# Patient Record
Sex: FEMALE | Race: WHITE | NOT HISPANIC OR LATINO | Employment: OTHER | URBAN - METROPOLITAN AREA
[De-identification: names, ages, dates, MRNs, and addresses within clinical notes are randomized per-mention and may not be internally consistent; named-entity substitution may affect disease eponyms.]

---

## 2017-12-01 ENCOUNTER — OFFICE VISIT (OUTPATIENT)
Dept: FAMILY MEDICINE | Facility: CLINIC | Age: 32
End: 2017-12-01

## 2017-12-01 VITALS
RESPIRATION RATE: 14 BRPM | BODY MASS INDEX: 22.44 KG/M2 | HEIGHT: 67 IN | DIASTOLIC BLOOD PRESSURE: 70 MMHG | TEMPERATURE: 98 F | SYSTOLIC BLOOD PRESSURE: 110 MMHG | HEART RATE: 70 BPM | OXYGEN SATURATION: 98 % | WEIGHT: 143 LBS

## 2017-12-01 DIAGNOSIS — Z71.89 ACP (ADVANCE CARE PLANNING): ICD-10-CM

## 2017-12-01 DIAGNOSIS — R20.2 PARESTHESIAS: ICD-10-CM

## 2017-12-01 DIAGNOSIS — Z83.49 FAMILY HISTORY OF THYROID DISEASE IN FATHER: ICD-10-CM

## 2017-12-01 DIAGNOSIS — Z01.411 ENCOUNTER FOR GYNECOLOGICAL EXAMINATION WITH ABNORMAL FINDING: Primary | ICD-10-CM

## 2017-12-01 PROBLEM — Z76.89 HEALTH CARE HOME: Status: ACTIVE | Noted: 2017-12-01

## 2017-12-01 LAB
% GRANULOCYTES: 49.1 %
ERYTHROCYTE [SEDIMENTATION RATE] IN BLOOD: 10 MM/HR (ref 0–20)
HCT VFR BLD AUTO: 42 % (ref 35–47)
HEMOGLOBIN: 13.5 G/DL (ref 11.7–15.7)
LYMPHOCYTES NFR BLD AUTO: 42.1 %
MCH RBC QN AUTO: 29.6 PG (ref 26–33)
MCHC RBC AUTO-ENTMCNC: 32.1 G/DL (ref 31–36)
MCV RBC AUTO: 92.1 FL (ref 78–100)
MONOCYTES NFR BLD AUTO: 8.8 %
PLATELET COUNT - QUEST: 212 10^9/L (ref 150–375)
RBC # BLD AUTO: 4.56 10*12/L (ref 3.8–5.2)
WBC # BLD AUTO: 6.2 10*9/L (ref 4–11)

## 2017-12-01 PROCEDURE — 82746 ASSAY OF FOLIC ACID SERUM: CPT | Mod: 90 | Performed by: FAMILY MEDICINE

## 2017-12-01 PROCEDURE — 85651 RBC SED RATE NONAUTOMATED: CPT | Mod: 90 | Performed by: FAMILY MEDICINE

## 2017-12-01 PROCEDURE — 36415 COLL VENOUS BLD VENIPUNCTURE: CPT | Performed by: FAMILY MEDICINE

## 2017-12-01 PROCEDURE — 85025 COMPLETE CBC W/AUTO DIFF WBC: CPT | Performed by: FAMILY MEDICINE

## 2017-12-01 PROCEDURE — 99385 PREV VISIT NEW AGE 18-39: CPT | Performed by: FAMILY MEDICINE

## 2017-12-01 PROCEDURE — 82607 VITAMIN B-12: CPT | Mod: 90 | Performed by: FAMILY MEDICINE

## 2017-12-01 PROCEDURE — 84443 ASSAY THYROID STIM HORMONE: CPT | Mod: 90 | Performed by: FAMILY MEDICINE

## 2017-12-01 PROCEDURE — 88142 CYTOPATH C/V THIN LAYER: CPT | Mod: 90 | Performed by: FAMILY MEDICINE

## 2017-12-01 NOTE — PATIENT INSTRUCTIONS
Await labs. Monitor symptoms with a diary/ ? Factors bringing on symptoms?    Total calcium intake of 1500 mgm/day, vitamin D 400-800IU/day and a regular weight bearing exercise program for prevention of osteoporosis is recommended treatment at this time.    Neurology consultation as next step

## 2017-12-01 NOTE — LETTER
December 11, 2017      Lynn D Adiel  [address] [address] [place] [address]        Dear ,    We are writing to inform you of your test results.    Your test results fall within the expected range(s) and do not indicate a reason for your paresthesia symptoms. Schedule your consultation and continue to monitor your symptoms for location, preceding activities and whatever makes your symptoms better or worse.    Normal thin prep (pap smear) results as well.  See next page        If you have any questions or concerns, please call the clinic at the number listed above.       Sincerely,        Simin Laurent MD

## 2017-12-01 NOTE — LETTER
[facility]  [address]  [address]  [place] [address]  [phone]      December 1, 2017      Lynn Chun  [address] [facility] [address]      EMERGENCY CARE PLAN  Presenting Problem Treatment Plan   Questions or concerns during clinic hours I will call the clinic directly:    [facility]  [address] [place] [address]  [place] [address]  [phone]   Questions or concerns outside clinic hours  I will call the 24 hour line at [phone]   Patient needs to schedule an appointment  I will call the  scheduling line at [phone]   Same day treatment   I will call the clinic first, then  urgent care and/or  express care if needed   Clinic Care Coordinators JARRED BennettW:  [phone]  Mari Schaefer RN:  [phone]  [facility] Clinical Support Staff: [phone]    Crisis Services:  Behavioral or Mental Health [facility] ([facility])   [phone]   Emergency treatment--Immediately CALL [phone]

## 2017-12-01 NOTE — PROGRESS NOTES
SUBJECTIVE:  Lynn Chun is an 32 year old G 2 P 2 woman who presents for   annual gyn exam. She made this appointment to discuss some pins and needles sensations she is having on her left outer leg and calf and now her right hand and forearm. See below. She knows this is not a part of a preventative exam.     Patient's last menstrual period was 11/10/2017. Periods are regular q 28-30 days, lasting   5 days. Dysmenorrhea:none. Cyclic symptoms   include none. No intermenstrual bleeding,   spotting, or discharge.    Current contraception: condoms  JAY exposure: no  History of abnormal Pap smear: No  Family history of uterine or ovarian cancer: No  Regular self breast exam: Yes  History of abnormal mammogram: No  Family history of breast cancer: No  History of abnormal lipids: No    No past medical history on file.    Family History   Problem Relation Age of Onset     Hyperlipidemia Mother      Connective Tissue Disorder Mother      raynaud's     Hypothyroidism Father 55     Graves     Myocardial Infarction Father 58     stent     Hyperlipidemia Maternal Grandmother      Lymphoma Paternal Grandmother      Connective Tissue Disorder Sister      raynaud's       Past Surgical History:   Procedure Laterality Date     HC TOOTH EXTRACTION W/FORCEP  age 25     PE TUBES  age 7     TONSILLECTOMY  age 7       No current outpatient prescriptions on file.     No current facility-administered medications for this visit.      Allergies   Allergen Reactions     Penicillins Hives       Social History   Substance Use Topics     Smoking status: Never Smoker     Smokeless tobacco: Never Used     Alcohol use 0.6 oz/week     1 Standard drinks or equivalent per week       Review Of Systems  Ears/Nose/Throat: negative  Respiratory: No shortness of breath, dyspnea on exertion, cough, or hemoptysis  Cardiovascular: negative  Gastrointestinal: negative  Genitourinary: negative  CNS: pins and needle feelings starting on her left outer calf  "and foot , then moved to her right forearm and hand and sometimes both feet/ comes and goes for about 1 month. Moves around in location and intensity. No accompanying weakness. She is still able to feel in these areas any touch. No raynaud's skin changes noted/ no thyroid symptoms. No new OTC vitamins or change in eating. Moved to [place] and it is colder here when symptoms started    OBJECTIVE:  /70 (BP Location: Left arm, Cuff Size: Adult Regular)  Pulse 70  Temp 98  F (36.7  C) (Oral)  Resp 14  Ht 1.702 m (5' 7\")  Wt 64.9 kg (143 lb)  LMP 11/10/2017  SpO2 98%  BMI 22.4 kg/m2  General appearance: healthy, alert, no distress, cooperative and smiling  Skin: Skin color, texture, turgor normal. No rashes or lesions.  Ears: negative  Nose/Sinuses: Nares normal. Septum midline. Mucosa normal. No drainage or sinus tenderness.  Oropharynx: Lips, mucosa, and tongue normal. Teeth and gums normal.  Neck: Neck supple. No adenopathy. Thyroid symmetric, normal size,, Carotids without bruits.  Lungs: negative, Percussion normal. Good diaphragmatic excursion. Lungs clear  Heart: negative, PMI normal. No lifts, heaves, or thrills. RRR. No murmurs, clicks gallops or rub  Breasts: Inspection negative. No nipple discharge or bleeding. No masses.  Abdomen: Abdomen soft, non-tender. BS normal. No masses, organomegaly  Pelvic: External genitalia and vagina normal. Bimanual and rectovaginal normal.  CNS;Cranial nerves are normal. Fundi are normal with sharp disc margins, no papilledema, hemorrhages or exudates noted. COLE. EOM's intact. DTR's, motor power and sensation normal and symmetric. Babinski sign absent.  Mental status normal.  Gait and station normal.  Cerebellar function is normal.  BMI : Body mass index is 22.4 kg/(m^2).     Normal hemogram  Normal sed rate    ASSESSMENT:(Z01.411) Encounter for gynecological examination with abnormal finding  (primary encounter diagnosis)  Plan: ThinPrep Pap and HPV (mRNA " E6/E7)         ([facility]), VENOUS COLLECTION, CL AFF         HEMOGRAM/PLATE/DIFF ([facility]), CANCELED: CL AFF         HEMOGLOBIN ([facility])        Total calcium intake of 1500 mgm/day, vitamin D 400-800IU/day and a regular weight bearing exercise program for prevention of osteoporosis is recommended treatment at this time.      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 Await labs.       (Z71.89) ACP (advance care planning)  Plan: I have reviewed the patient's medical history in detail and updated the computerized patient record.          Dx:  1)  Pap smear  2)  Mammography, lipids at appropriate intervals        PE:  Reviewed health maintenance including diet, regular exercise   and periodic exams.

## 2017-12-01 NOTE — MR AVS SNAPSHOT
"              After Visit Summary   12/1/2017    Lynn Chun    MRN: [id number]           Patient Information     Date Of Birth          1985        Visit Information        Provider Department      12/1/2017 2:15 PM Simin Laurent MD [facility]        Today's Diagnoses     Encounter for gynecological examination with abnormal finding    -  1    Paresthesias        ACP (advance care planning)          Care Instructions    Await labs    Total calcium intake of 1500 mgm/day, vitamin D 400-800IU/day and a regular weight bearing exercise program for prevention of osteoporosis is recommended treatment at this time.    Neurology consultation possible          Follow-ups after your visit        Who to contact     If you have questions or need follow up information about today's clinic visit or your schedule please contact [facility] directly at [phone].  Normal or non-critical lab and imaging results will be communicated to you by [employer], letter or phone within 4 business days after the clinic has received the results. If you do not hear from us within 7 days, please contact the clinic through [employer] or phone. If you have a critical or abnormal lab result, we will notify you by phone as soon as possible.  Submit refill requests through [employer] or call your pharmacy and they will forward the refill request to us. Please allow 3 business days for your refill to be completed.          Additional Information About Your Visit        MyChart Information     [employer] lets you send messages to your doctor, view your test results, renew your prescriptions, schedule appointments and more. To sign up, go to [email or web address] . Click on \"Log in\" on the left side of the screen, which will take you to the Welcome page. Then click on \"Sign up Now\" on the right side of the page.     You will be asked to enter the access code listed below, as well as some personal " "information. Please follow the directions to create your username and password.     Your access code is: [email or web address]  Expires: 3/1/2018  3:16 PM     Your access code will  in 90 days. If you need help or a new code, please call your [place] clinic or [phone].        Care EveryWhere ID     This is your Care EveryWhere ID. This could be used by other organizations to access your [place] medical records  [id number]        Your Vitals Were     Pulse Temperature Respirations Height Last Period Pulse Oximetry    70 98  F (36.7  C) (Oral) 14 1.702 m (5' 7\") 11/10/2017 98%    BMI (Body Mass Index)                   22.4 kg/m2            Blood Pressure from Last 3 Encounters:   17 110/70    Weight from Last 3 Encounters:   17 64.9 kg (143 lb)              We Performed the Following     CL AFF HEMOGRAM/PLATE/DIFF ([facility])     ESR, WESTERGREN ([facility])     FOLATE ([facility])     ThinPrep Pap and HPV (mRNA E6/E7){HPV-REFLEX} ([facility])     TSH ([facility])     VENOUS COLLECTION     VITAMIN B12 ([facility])        Primary Care Provider Office Phone # Fax #    CAROLYNN Cee [phone]       [address] [facility] [address]        Equal Access to Services     NIDIA MONTOYA : Hadii aad ku hadasho Soomaali, waaxda luqadaha, qaybta kaalmada adeegyada, laurent turner. So [facility] [phone].    ATENCIÓN: Si habla español, tiene a baptiste disposición servicios gratuitos de asistencia lingüística. Llame al [phone].    We comply with applicable federal civil rights laws and [place] laws. We do not discriminate on the basis of race, color, national origin, age, disability, sex, sexual orientation, or gender identity.            Thank you!     Thank you for choosing [facility]  for your care. Our goal is always to provide you with excellent care. Hearing back from our patients is one way we can continue to improve our services. Please take a few " minutes to complete the written survey that you may receive in the mail after your visit with us. Thank you!             Your Updated Medication List - Protect others around you: Learn how to safely use, store and throw away your medicines at www.disposemymeds.org.      Notice  As of 12/1/2017  3:16 PM    You have not been prescribed any medications.

## 2017-12-01 NOTE — NURSING NOTE
Patient is here for a full physical exam and pap.  Pre-Visit Screening :  Immunizations : up to date    Colonoscopy : na  Mammogram : na  Asthma Action Plan/Test : na  PHQ9/GAD7 : na  Pulse - regular  Medication Reconciliation: complete    BP done on the left arm, with a med sized cuff.  Pulse - regular  My Chart - declines    CLASSIFICATION OF OVERWEIGHT AND OBESITY BY BMI                         Obesity Class           BMI(kg/m2)  Underweight                                    < 18.5  Normal                                         18.5-24.9  Overweight                                     25.0-29.9  OBESITY                     I                  30.0-34.9                              II                 35.0-39.9  EXTREME OBESITY             III                >40                             Patient's  BMI Body mass index is 22.4 kg/(m^2).  http://hin.nhlbi.nih.gov/menuplanner/menu.cgi  Questioned patient about current smoking habits.  Pt. has never smoked.  ETOH screening:  Questions:  1-How often do you have a drink containing alcohol?                             1 times per week(s)  2-How many drinks containing alcohol do you have on a typical day when you are         Drinking?                              1   3- How often do you have 5 or more drinks on one occasion?                              never per never    Have you ever:  None of the patient's responses to the CAGE screening were positive / Negative CAGE score     The patient has verbalized that it is ok to leave a detailed voice message on the patient's cell phone    [phone] (home)      with results/recommendations from this visit.

## 2017-12-02 LAB
FOLATE: 22.9 NG/ML
TSH SERPL-ACNC: 0.74 MIU/L
VIT B12 SERPL-MCNC: 738 PG/ML (ref 200–1100)

## 2017-12-05 ENCOUNTER — TELEPHONE (OUTPATIENT)
Dept: FAMILY MEDICINE | Facility: CLINIC | Age: 32
End: 2017-12-05

## 2017-12-05 DIAGNOSIS — R20.2 PARESTHESIAS: Primary | ICD-10-CM

## 2017-12-05 NOTE — TELEPHONE ENCOUNTER
Lynn D [place] called looking for her lab results. I informed her that we are waiting on her PAP to come back still but all the other tests looked normal.  She seemed disappointment because she still does not know what is causing her issues.    She is wanting a call back from Dr. Laurent to discuss the next steps/ the plan going forward for her issues.    Patient Call Back Info:  [phone] (home)     Thank You,  Kayli Nevarez CMA

## 2017-12-05 NOTE — TELEPHONE ENCOUNTER
I called the patient and informed her that a referral has been placed for Neurology and someone should be contacting her soon to set up an appointment.    Kayli Nevarez, SANJUANA

## 2017-12-05 NOTE — TELEPHONE ENCOUNTER
Next step is an evaluation with a neurology specialists. [facility] of neurology referral sent to day    Awaiting thin prep

## 2017-12-07 LAB
CLINICAL HISTORY - QUEST: NORMAL
CYTOTECHNOLOGIST - QUEST: NORMAL
DESCRIPTIVE DIAGNOSIS - QUEST: NORMAL
LAST PAP DX - QUEST: NORMAL
LMP - QUEST: NORMAL
PREV BX DX - QUEST: NORMAL
SOURCE: NORMAL
STATEMENT OF ADEQUACY - QUEST: NORMAL

## 2017-12-08 ENCOUNTER — TELEPHONE (OUTPATIENT)
Dept: FAMILY MEDICINE | Facility: CLINIC | Age: 32
End: 2017-12-08

## 2017-12-08 NOTE — TELEPHONE ENCOUNTER
Lynn Chun called the clinic support line with the following:    States that her appointment is not for another 2 months and feels uncomfortable waiting that long. Is there any testing that can be done wile she is waiting.     Was hoping that you could giver her a call - [phone] (home)

## 2017-12-11 NOTE — TELEPHONE ENCOUNTER
I talked to patient RE referral for Neurology. Has appt 02/08/2018 with Dr. Gregorio. Is on the cancellation list. Will call back if her appt changes.

## 2017-12-11 NOTE — TELEPHONE ENCOUNTER
I have done all the lab work for screening and can not recommend any other until her neurology consultation.  Sending a letter today with results.     Will ask referral specialists to recommend anything for sooner appointment. Cancellation list, etc.

## 2017-12-14 ENCOUNTER — TRANSFERRED RECORDS (OUTPATIENT)
Dept: FAMILY MEDICINE | Facility: CLINIC | Age: 32
End: 2017-12-14

## 2017-12-14 ENCOUNTER — HOSPITAL ENCOUNTER (OUTPATIENT)
Dept: LAB | Facility: CLINIC | Age: 32
Discharge: HOME | End: 2017-12-14
Attending: PSYCHIATRY & NEUROLOGY | Admitting: PSYCHIATRY & NEUROLOGY
Payer: COMMERCIAL

## 2017-12-14 DIAGNOSIS — R20.2 NUMBNESS AND TINGLING: Primary | ICD-10-CM

## 2017-12-14 DIAGNOSIS — M54.12 CERVICAL RADICULOPATHY: ICD-10-CM

## 2017-12-14 DIAGNOSIS — R20.0 NUMBNESS AND TINGLING: Primary | ICD-10-CM

## 2017-12-14 DIAGNOSIS — M54.16 LUMBAR RADICULOPATHY: ICD-10-CM

## 2017-12-14 LAB
CALCIUM SERPL-MCNC: 9 MG/DL (ref 8.5–10.1)
MAGNESIUM SERPL-MCNC: 2.2 MG/DL (ref 1.6–2.3)
PTH-INTACT SERPL-MCNC: 83 PG/ML (ref 12–72)

## 2017-12-14 PROCEDURE — 36415 COLL VENOUS BLD VENIPUNCTURE: CPT | Performed by: PSYCHIATRY & NEUROLOGY

## 2017-12-14 PROCEDURE — 83970 ASSAY OF PARATHORMONE: CPT | Performed by: PSYCHIATRY & NEUROLOGY

## 2017-12-14 PROCEDURE — 82310 ASSAY OF CALCIUM: CPT | Performed by: PSYCHIATRY & NEUROLOGY

## 2017-12-14 PROCEDURE — 82306 VITAMIN D 25 HYDROXY: CPT | Performed by: PSYCHIATRY & NEUROLOGY

## 2017-12-14 PROCEDURE — 83735 ASSAY OF MAGNESIUM: CPT | Performed by: PSYCHIATRY & NEUROLOGY

## 2017-12-15 LAB — DEPRECATED CALCIDIOL+CALCIFEROL SERPL-MC: 30 UG/L (ref 20–75)

## 2018-03-20 ENCOUNTER — TRANSFERRED RECORDS (OUTPATIENT)
Dept: FAMILY MEDICINE | Facility: CLINIC | Age: 33
End: 2018-03-20

## 2018-05-02 ENCOUNTER — OFFICE VISIT (OUTPATIENT)
Dept: FAMILY MEDICINE | Facility: CLINIC | Age: 33
End: 2018-05-02

## 2018-05-02 VITALS
TEMPERATURE: 98 F | OXYGEN SATURATION: 96 % | DIASTOLIC BLOOD PRESSURE: 82 MMHG | RESPIRATION RATE: 16 BRPM | HEIGHT: 67 IN | HEART RATE: 88 BPM | BODY MASS INDEX: 21.53 KG/M2 | SYSTOLIC BLOOD PRESSURE: 128 MMHG | WEIGHT: 137.2 LBS

## 2018-05-02 DIAGNOSIS — J06.9 UPPER RESPIRATORY TRACT INFECTION, UNSPECIFIED TYPE: Primary | ICD-10-CM

## 2018-05-02 DIAGNOSIS — F43.0 ACUTE REACTION TO STRESS: ICD-10-CM

## 2018-05-02 DIAGNOSIS — R05.9 COUGH: ICD-10-CM

## 2018-05-02 PROCEDURE — 99213 OFFICE O/P EST LOW 20 MIN: CPT | Performed by: FAMILY MEDICINE

## 2018-05-02 RX ORDER — GABAPENTIN 300 MG/1
300-600 CAPSULE ORAL AT BEDTIME
Refills: 5 | COMMUNITY
Start: 2017-12-27 | End: 2019-07-02

## 2018-05-02 RX ORDER — GUAIFENESIN 600 MG/1
600 TABLET, EXTENDED RELEASE ORAL 2 TIMES DAILY PRN
Qty: 20 TABLET | Refills: 0 | Status: SHIPPED | OUTPATIENT
Start: 2018-05-02 | End: 2019-07-02

## 2018-05-02 RX ORDER — MONTELUKAST SODIUM 10 MG/1
10 TABLET ORAL AT BEDTIME
Qty: 14 TABLET | Refills: 0 | Status: SHIPPED | OUTPATIENT
Start: 2018-05-02 | End: 2019-07-02

## 2018-05-02 RX ORDER — ELECTROLYTES/DEXTROSE
1 SOLUTION, ORAL ORAL DAILY
COMMUNITY

## 2018-05-02 NOTE — MR AVS SNAPSHOT
"              After Visit Summary   5/2/2018    Lynn Chun    MRN: [id number]           Patient Information     Date Of Birth          1985        Visit Information        Provider Department      5/2/2018 3:15 PM Simin Laurent MD [facility]        Today's Diagnoses     Upper respiratory tract infection, unspecified type    -  1    Cough        Acute reaction to stress          Care Instructions    Symptomatic care with decongestants, fluids, tylenol/advil prn. Use GUAIFENESIN  MG OR TBCR, 1 tab po BID (Twice per day), D: 20, R: 0 for congestion and cough.    In addition, I have suggested that the patient   monitor for symptoms of bacterial infection expecting slow gradual resolution of viral URI as the natural course.    Get back to Dr Gregorio for further evaluation and care.          Follow-ups after your visit        Who to contact     If you have questions or need follow up information about today's clinic visit or your schedule please contact [facility] directly at [phone].  Normal or non-critical lab and imaging results will be communicated to you by [employer], letter or phone within 4 business days after the clinic has received the results. If you do not hear from us within 7 days, please contact the clinic through [employer] or phone. If you have a critical or abnormal lab result, we will notify you by phone as soon as possible.  Submit refill requests through [facility] or call your pharmacy and they will forward the refill request to us. Please allow 3 business days for your refill to be completed.          Additional Information About Your Visit        [employer]IN[employer] Information     [facility] lets you send messages to your doctor, view your test results, renew your prescriptions, schedule appointments and more. To sign up, go to [email or web address] . Click on \"Log in\" on the left side of the screen, which will take you to the Welcome page. Then " "click on \"Sign up Now\" on the right side of the page.     You will be asked to enter the access code listed below, as well as some personal information. Please follow the directions to create your username and password.     Your access code is: [email or web address]  Expires: 2018  4:11 PM     Your access code will  in 90 days. If you need help or a new code, please call your [place] clinic or [phone].        Care EveryWhere ID     This is your Care EveryWhere ID. This could be used by other organizations to access your [place] medical records  [id number]        Your Vitals Were     Pulse Temperature Respirations Height Last Period Pulse Oximetry    88 98  F (36.7  C) (Oral) 16 1.695 m (5' 6.75\") 2018 (Within Days) 96%    Breastfeeding? BMI (Body Mass Index)                No 21.65 kg/m2           Blood Pressure from Last 3 Encounters:   18 128/82   17 110/70    Weight from Last 3 Encounters:   18 62.2 kg (137 lb 3.2 oz)   17 64.9 kg (143 lb)              Today, you had the following     No orders found for display         Today's Medication Changes          These changes are accurate as of 18  4:11 PM.  If you have any questions, ask your nurse or doctor.               Start taking these medicines.        Dose/Directions    guaiFENesin 600 MG 12 hr tablet   Commonly known as:  MUCINEX   Used for:  Upper respiratory tract infection, unspecified type, Cough   Started by:  Simin Laurent MD        Dose:  600 mg   Take 1 tablet (600 mg) by mouth 2 times daily as needed for congestion   Quantity:  20 tablet   Refills:  0       montelukast 10 MG tablet   Commonly known as:  SINGULAIR   Used for:  Cough   Started by:  Simin Laurent MD        Dose:  10 mg   Take 1 tablet (10 mg) by mouth At Bedtime   Quantity:  14 tablet   Refills:  0            Where to get your medicines      These medications were sent to [facility]pharmacy #[id number] [facility] [address] [address] [address], [place] [address]     Phone:  [phone]     guaiFENesin 600 MG 12 hr tablet    montelukast 10 MG tablet                Primary Care Provider Office Phone # Fax #    Simin Laurent [phone] [address] [address]        Equal Access to Services     [facility]: Hadii aad ku hadasho Soomaali, waaxda luqadaha, qaybta kaalmada adeegyada, waxay idiin hayaan adeeg khghadash lasilvion ah. So [facility] [phone].    ATENCIÓN: Si habla español, tiene a baptiste disposición servicios gratuitos de asistencia lingüística. [facility] [phone].    We comply with applicable federal civil rights laws and [place] laws. We do not discriminate on the basis of race, color, national origin, age, disability, sex, sexual orientation, or gender identity.            Thank you!     Thank you for choosing [facility]  for your care. Our goal is always to provide you with excellent care. Hearing back from our patients is one way we can continue to improve our services. Please take a few minutes to complete the written survey that you may receive in the mail after your visit with us. Thank you!             Your Updated Medication List - Protect others around you: Learn how to safely use, store and throw away your medicines at www.disposemymeds.org.          This list is accurate as of 5/2/18  4:11 PM.  Always use your most recent med list.                   Brand Name Dispense Instructions for use Diagnosis    cholecalciferol 1000 units capsule    vitamin  -D     Take 4 capsules by mouth daily        gabapentin 300 MG capsule    NEURONTIN     Take 300-600 mg by mouth At Bedtime        guaiFENesin 600 MG 12 hr tablet    MUCINEX    20 tablet    Take 1 tablet (600 mg) by mouth 2 times daily as needed for congestion    Upper respiratory tract infection, unspecified type, Cough       montelukast 10 MG tablet    SINGULAIR    14 tablet    Take 1 tablet (10 mg)  by mouth At Bedtime    Cough       MULTIVITAMIN ADULT Tabs      Take 1 tablet by mouth daily

## 2018-05-02 NOTE — NURSING NOTE
Lynn Chun is here today for URI x1.5 weeks, Cough, Chest Congestion, and Post Nasal Drainage. Also feels like there is a lump in her throat all the time, this has been going on for over a month.  Patient is also here for Muscle Twitches in arms or calves, burning sensation at night will wake her up, and fatigue.    Pre-visit Screening:    Immunizations:  up to date  Colonoscopy:  NA  Mammogram: NA  Asthma Action Test/Plan:  NA  PHQ9:  NA  GAD7:  NA    Questioned patient about current smoking habits Pt. has never smoked.    Is it ok to leave a detailed message on cell phone's voice mail for today's visit only? Yes     Telephone Information:   Mobile [phone]       Kayli Nevarez [facility]

## 2018-05-02 NOTE — PATIENT INSTRUCTIONS
Symptomatic care with decongestants, fluids, tylenol/advil prn. Use GUAIFENESIN  MG OR TBCR, 1 tab po BID (Twice per day), D: 20, R: 0 for congestion and cough.    In addition, I have suggested that the patient   monitor for symptoms of bacterial infection expecting slow gradual resolution of viral URI as the natural course.    Get back to Dr Gregorio for further evaluation and care.

## 2018-05-02 NOTE — PROGRESS NOTES
SUBJECTIVE: 33 year old female complaining of sore throat followed by nasal congestion and cough for 10 day(s).   The patient describes pressure in her upper airways and fatigue. Sleeping ok at night but sometimes awakes choking on phlegm.   The patient denies a history of fever, SOB or GI complaints. Her 2 and 4 year old started this illness in their family.   Smoking history: No.   Relevant past medical history: positive for her continued worry about numbness and tingling in multifocal areas. Dr Gregorio has started Vitamin D and used a cortisone injection into the wrist with marked improvement of symptoms. She is very afraid of MS as her diagnosis.    OBJECTIVE: The patient appears healthy, alert, no distress, cooperative, crying and fatigued.   EARS: negative  NOSE/SINUS: positive findings: mucosa erythematous and swollen, clear rhinorrhea   THROAT: normal, no tonsillar hypertrophy, no exudates present and post nasal drainage   NECK:Neck supple. No adenopathy. Thyroid symmetric, normal size,, Carotids without bruits.   CHEST: Clear with dry cough  ABD: The abdomen is soft without tenderness, guarding, mass or organomegaly. Bowel sounds are normal. No CVA tenderness or inguinal adenopathy noted.      ASSESSMENT: (J06.9) Upper respiratory tract infection, unspecified type  (primary encounter diagnosis)  Plan: guaiFENesin (MUCINEX) 600 MG 12 hr tablet        Symptomatic care with decongestants, fluids, tylenol/advil prn. Use GUAIFENESIN  MG OR TBCR, 1 tab po BID (Twice per day), D: 20, R: 0 for congestion and cough.    In addition, I have suggested that the patient   monitor for symptoms of bacterial infection expecting slow gradual resolution of viral URI as the natural course.      (R05) Cough  Plan: guaiFENesin (MUCINEX) 600 MG 12 hr tablet,         montelukast (SINGULAIR) 10 MG tablet        Potential medication side effects were discussed with the patient; let me know if any occur.      (F43.0) Acute  reaction to stress  Comment: support  Plan: Reviewed her symptoms and MS differential. Has an appointment with neurology in 2 weeks.

## 2018-05-24 ENCOUNTER — TRANSFERRED RECORDS (OUTPATIENT)
Dept: FAMILY MEDICINE | Facility: CLINIC | Age: 33
End: 2018-05-24

## 2018-10-24 ENCOUNTER — TRANSFERRED RECORDS (OUTPATIENT)
Dept: FAMILY MEDICINE | Facility: CLINIC | Age: 33
End: 2018-10-24

## 2019-01-14 ENCOUNTER — TELEPHONE (OUTPATIENT)
Dept: FAMILY MEDICINE | Facility: CLINIC | Age: 34
End: 2019-01-14

## 2019-01-14 NOTE — TELEPHONE ENCOUNTER
Patient called in and left a message asking for a call back. I called her back and she asked if there was another form of emergency contraceptive besides the Plan B pill that gives you your period right away. I informed her that there is not and that with the Plan B, after you take it you are supposed to wait for your period to come. She stated that it said the same thing on the back of the box but that she thought she remembered there being another option that gives you your period right away. She expressed understanding to this and had no further questions or concerns at this time.

## 2019-07-02 ENCOUNTER — OFFICE VISIT (OUTPATIENT)
Dept: FAMILY MEDICINE | Facility: CLINIC | Age: 34
End: 2019-07-02

## 2019-07-02 VITALS
SYSTOLIC BLOOD PRESSURE: 112 MMHG | HEART RATE: 108 BPM | TEMPERATURE: 98.4 F | WEIGHT: 137.6 LBS | BODY MASS INDEX: 20.85 KG/M2 | HEIGHT: 68 IN | RESPIRATION RATE: 16 BRPM | DIASTOLIC BLOOD PRESSURE: 68 MMHG | OXYGEN SATURATION: 99 %

## 2019-07-02 DIAGNOSIS — K59.09 OTHER CONSTIPATION: ICD-10-CM

## 2019-07-02 DIAGNOSIS — Z83.49 FAMILY HISTORY OF THYROID DISEASE IN FATHER: ICD-10-CM

## 2019-07-02 DIAGNOSIS — R10.32 ABDOMINAL PAIN, LEFT LOWER QUADRANT: Primary | ICD-10-CM

## 2019-07-02 PROBLEM — G56.20 ULNAR NEUROPATHY: Status: ACTIVE | Noted: 2017-12-15

## 2019-07-02 PROBLEM — G56.00 CTS (CARPAL TUNNEL SYNDROME): Status: ACTIVE | Noted: 2017-12-15

## 2019-07-02 PROBLEM — R25.2 CRAMPS, EXTREMITY: Status: ACTIVE | Noted: 2018-05-23

## 2019-07-02 LAB
% GRANULOCYTES: 58.2 %
ALBUMIN (URINE): ABNORMAL MG/DL
APPEARANCE UR: CLEAR
BACTERIA, UR: ABNORMAL
BILIRUB UR QL: ABNORMAL
CASTS/LPF: ABNORMAL
COLOR UR: YELLOW
EP/HPF: ABNORMAL
GLUCOSE URINE: ABNORMAL MG/DL
HCT VFR BLD AUTO: 44.1 % (ref 35–47)
HEMOGLOBIN: 14.1 G/DL (ref 11.7–15.7)
HGB UR QL: ABNORMAL
KETONES UR QL: ABNORMAL MG/DL
LEUKOCYTE ESTERASE - QUEST: ABNORMAL
LYMPHOCYTES NFR BLD AUTO: 34 %
MCH RBC QN AUTO: 29.9 PG (ref 26–33)
MCHC RBC AUTO-ENTMCNC: 32 G/DL (ref 31–36)
MCV RBC AUTO: 93.4 FL (ref 78–100)
MISC.: ABNORMAL
MONOCYTES NFR BLD AUTO: 7.8 %
NITRITE UR QL STRIP: ABNORMAL
PH UR STRIP: 7 PH (ref 5–7)
PLATELET COUNT - QUEST: 199 10^9/L (ref 150–375)
RBC # BLD AUTO: 4.72 10*12/L (ref 3.8–5.2)
RBC, UR MICRO: ABNORMAL (ref ?–2)
SP. GRAVITY: 1.02
UROBILINOGEN UR QL STRIP: 0.2 EU/DL (ref 0.2–1)
WBC # BLD AUTO: 7.9 10*9/L (ref 4–11)
WBC, UR MICRO: ABNORMAL (ref ?–2)

## 2019-07-02 PROCEDURE — 85025 COMPLETE CBC W/AUTO DIFF WBC: CPT | Performed by: FAMILY MEDICINE

## 2019-07-02 PROCEDURE — 36415 COLL VENOUS BLD VENIPUNCTURE: CPT | Performed by: FAMILY MEDICINE

## 2019-07-02 PROCEDURE — 87086 URINE CULTURE/COLONY COUNT: CPT | Mod: 90 | Performed by: FAMILY MEDICINE

## 2019-07-02 PROCEDURE — 87147 CULTURE TYPE IMMUNOLOGIC: CPT | Mod: 90 | Performed by: FAMILY MEDICINE

## 2019-07-02 PROCEDURE — 74018 RADEX ABDOMEN 1 VIEW: CPT | Performed by: FAMILY MEDICINE

## 2019-07-02 PROCEDURE — 87088 URINE BACTERIA CULTURE: CPT | Mod: 90 | Performed by: FAMILY MEDICINE

## 2019-07-02 PROCEDURE — 99214 OFFICE O/P EST MOD 30 MIN: CPT | Performed by: FAMILY MEDICINE

## 2019-07-02 PROCEDURE — 81001 URINALYSIS AUTO W/SCOPE: CPT | Performed by: FAMILY MEDICINE

## 2019-07-02 PROCEDURE — 84443 ASSAY THYROID STIM HORMONE: CPT | Mod: 90 | Performed by: FAMILY MEDICINE

## 2019-07-02 RX ORDER — POLYETHYLENE GLYCOL 3350 17 G/17G
1 POWDER, FOR SOLUTION ORAL DAILY
Qty: 116 G | Refills: 0 | Status: SHIPPED | OUTPATIENT
Start: 2019-07-02 | End: 2019-11-07

## 2019-07-02 ASSESSMENT — MIFFLIN-ST. JEOR: SCORE: 1364.71

## 2019-07-02 NOTE — NURSING NOTE
Homa is here today for lower abdominal pain.    Pre-visit Screening:  Immunizations:  up to date  Colonoscopy:  NA  Mammogram: NA  Asthma Action Test/Plan: NA  PHQ9:  PHQ 2 done today  GAD7:  NA  Questioned patient about current smoking habits Pt. has never smoked.  Ok to leave detailed message on voice mail for today's visit only Yes, phone # [phone]

## 2019-07-02 NOTE — PROGRESS NOTES
"SUBJECTIVE:  Lynn Chun is an 34 year old female who presents for evaluation of abdominal pain. Characteristics of the pain are as follows:    Location: suprapubic and LLQ without radiation  Quality: pressure and cramping  Quantity: 2/10 in intensity  Chronicity: Onset 4 months ago, waxing and waning since  Aggravating factors: nothing  Alleviating factors: nothing  Associated symptoms: none  Family history: negative    NO WORRISOME SYMPTOMS OF FEVER, MELENA, HEMATURIA OR WEIGHT LOSS    Past Medical History:   Diagnosis Date     Family history of thyroid disease in father 12/1/2017       Past Surgical History:   Procedure Laterality Date     HC TOOTH EXTRACTION W/FORCEP  age 25     PE TUBES  age 7     TONSILLECTOMY  age 7       Current Outpatient Medications   Medication     cholecalciferol (VITAMIN  -D) 1000 units capsule     Multiple Vitamins-Minerals (MULTIVITAMIN ADULT) TABS     No current facility-administered medications for this visit.      Allergies   Allergen Reactions     Penicillins Hives       Social History     Tobacco Use     Smoking status: Never Smoker     Smokeless tobacco: Never Used   Substance Use Topics     Alcohol use: Yes     Alcohol/week: 0.6 oz     Types: 1 Standard drinks or equivalent per week       Review Of Systems  Respiratory: No shortness of breath, dyspnea on exertion, cough, or hemoptysis  Cardiovascular: negative  Gastrointestinal: negative  Genitourinary: negative    OBJECTIVE:  /68 (BP Location: Right arm, Patient Position: Sitting, Cuff Size: Adult Large)   Pulse 108   Temp 98.4  F (36.9  C) (Oral)   Resp 16   Ht 1.715 m (5' 7.5\")   Wt 62.4 kg (137 lb 9.6 oz)   LMP 06/22/2019   SpO2 99%   BMI 21.23 kg/m    General appearance: healthy, alert, no distress, cooperative and smiling  Hydration: well hydrated  Ears: R TM - normal: no effusions, no erythema, and normal landmarks, L TM - normal: no effusions, no erythema, and normal landmarks  Nose: " normal  Oropharynx: normal  Neck: normal, supple and no adenopathy  Lungs: normal and clear to auscultation  Heart: regular rate and rhythm and no murmurs, clicks, or gallops  Abdomen: flat and symmetric, normal bowel sounds.   Tenderness: present: mild LLQ rebound,guarding absent  Masses: none  Organomegaly: none  GYN: Vagina and vulva are normal;  no discharge is noted.  Cervix normal. Uterus anteverted and mobile, normal in size and shape without tenderness.  Adnexa normal in size without masses or tenderness.    UA: negative  CBC: WNL  Xray:moderate stool lower colon/ no free air/ no obstruction    ASSESSMENT/PLAN:  (R10.32) Abdominal pain, left lower quadrant  (primary encounter diagnosis)  Comment: monitor for worrisome symptoms  Plan: HCL  Urinalysis, Routine ([facility]), CL AFF         HEMOGRAM/PLATE/DIFF ([facility]), VENOUS COLLECTION,         XR Abdomen 1 View, Urine Culture Aerobic         Bacterial, TSH with free T4 reflex ([facility]),         polyethylene glycol (MIRALAX/GLYCOLAX) powder        await labs. Push fiber and fluid. Monitor response to MIRALAX. Recheck 7-10 days    (Z83.49) Family history of thyroid disease in father  Plan: TSH with free T4 reflex ([facility])            (K59.09) Other constipation  Plan: polyethylene glycol (MIRALAX/GLYCOLAX) powder        Read handout

## 2019-07-02 NOTE — LETTER
July 8, 2019      Lynn JAMES Adiel  [address] [id number]        Dear ,    We are writing to inform you of your test results.    Normal thyroid hormone level.  Negative urine culture for an infection.        If you have any questions or concerns, please call the clinic at the number listed above.       Sincerely,        Simin Laurent MD

## 2019-07-03 NOTE — PATIENT INSTRUCTIONS
monitor for worrisome symptoms  Plan: HCL  Urinalysis, Routine ([facility]), CL AFF         HEMOGRAM/PLATE/DIFF ([facility]), VENOUS COLLECTION,         XR Abdomen 1 View, Urine Culture Aerobic         Bacterial, TSH with free T4 reflex ([facility]),         polyethylene glycol (MIRALAX/GLYCOLAX) powder        await labs. Push fiber and fluid. Monitor response to MIRALAX.    (Z83.49) Family history of thyroid disease in father  Plan: TSH with free T4 reflex ([facility])            (K59.09) Other constipation  Plan: polyethylene glycol (MIRALAX/GLYCOLAX) powder        Read handout

## 2019-07-04 LAB — TSH SERPL-ACNC: 0.93 MIU/L

## 2019-07-05 LAB — URINE VOIDED CULTURE: ABNORMAL

## 2019-11-07 ENCOUNTER — OFFICE VISIT (OUTPATIENT)
Dept: FAMILY MEDICINE | Facility: CLINIC | Age: 34
End: 2019-11-07

## 2019-11-07 VITALS
HEART RATE: 96 BPM | DIASTOLIC BLOOD PRESSURE: 60 MMHG | HEIGHT: 67 IN | OXYGEN SATURATION: 96 % | BODY MASS INDEX: 22.91 KG/M2 | WEIGHT: 146 LBS | TEMPERATURE: 99.1 F | RESPIRATION RATE: 16 BRPM | SYSTOLIC BLOOD PRESSURE: 108 MMHG

## 2019-11-07 DIAGNOSIS — J01.90 ACUTE SINUSITIS WITH SYMPTOMS > 10 DAYS: Primary | ICD-10-CM

## 2019-11-07 DIAGNOSIS — J06.9 UPPER RESPIRATORY TRACT INFECTION, UNSPECIFIED TYPE: ICD-10-CM

## 2019-11-07 PROCEDURE — 99213 OFFICE O/P EST LOW 20 MIN: CPT | Performed by: FAMILY MEDICINE

## 2019-11-07 RX ORDER — DOXYCYCLINE 100 MG/1
100 CAPSULE ORAL 2 TIMES DAILY
Qty: 20 CAPSULE | Refills: 0 | Status: SHIPPED | OUTPATIENT
Start: 2019-11-07 | End: 2020-06-15

## 2019-11-07 ASSESSMENT — MIFFLIN-ST. JEOR: SCORE: 1398.84

## 2019-11-07 NOTE — NURSING NOTE
Homa is here for cough and sinus congestion for 2 weeks and recently developed a fever.          Pre-visit Screening:  Immunizations:  up to date  Colonoscopy:  NA  Mammogram: NA  Asthma Action Test/Plan:  NA  PHQ9:  None  GAD7:  None  Questioned patient about current smoking habits Pt. has never smoked.  Ok to leave detailed message on voice mail for today's visit only Yes, phone #

## 2019-11-07 NOTE — PROGRESS NOTES
SUBJECTIVE: 34 year old female complaining of nasal congestion followed by cough for 3 week(s).   The patient describes getting better until fever, chills, sinus pain started 3 days ago.  Cheek pain this morning  The patient denies a history of Gi complaints, SOB or rash.   Smoking history: No.   Relevant past medical history: negative.    OBJECTIVE: The patient appears healthy, alert, no distress, cooperative, smiling and fatigued.   EARS: negative  NOSE/SINUS: positive findings: mucosa erythematous and swollen, purulent rhinorrhea, tender left maxillary sinus area   THROAT: normal and post nasal drainage   NECK:Neck supple. No adenopathy. Thyroid symmetric, normal size,, Carotids without bruits.   CHEST: Clear    ASSESSMENT: (J01.90) Acute sinusitis with symptoms > 10 days  (primary encounter diagnosis)  Plan: doxycycline hyclate (VIBRAMYCIN) 100 MG capsule        Sinus pressure is primarily a problem of drainage.  You can best help your body clear the sinus secretions and pressure by opening up the natural passageways which are often blocked by viral colds and allergies.      Short courses of a nasal decongestant spray (Afrin or Neosinephrine) are one of the most effective tools in opening sinus drainage passageways.  Their use should be restricted to 3 days though due to the high risk of nasal addiction.  Pseudoephedrine or phenylephrine (Sudafed) is often helpful but it can cause elevations in blood pressure and insomnia.     Sometimes a nasal saline spray will help rinse out the nasal passages and feel good.     Guaifenesin (Robitussin or Mucinex) helps loosen secretions and often help make the mucous more liquid and easier to clear.    For pain and fevers, acetaminophen (Tylenol) is most appropriate.  Ibuprofen (Advil) or naproxen (Aleve) are useful too and last longer but they can cause elevation of blood pressure or stomach problems.    Antihistamines (Benadryl, Dimetapp, etc.) cause sedation, confusion,  bowel and urinary abnormalities and are of little use for infectious causes of cough and nasal congestion.  Their use should be reserved for allergic symptoms.    The body needs to be treated well in order to help heal itself.  Rest as needed.  It is ok to reduce food intake if appetite is poor but it is quite important to maintain/increase fluid intake.  Sinus pressure and infections usually go away on their own with appropriate care.  If symptoms worsen or persist beyond 10 days, an antibiotic might be worth trying to treat a possible bacterial component.

## 2019-11-07 NOTE — PATIENT INSTRUCTIONS
Sinus pressure is primarily a problem of drainage.  You can best help your body clear the sinus secretions and pressure by opening up the natural passageways which are often blocked by viral colds and allergies.      Short courses of a nasal decongestant spray (Afrin or Neosinephrine) are one of the most effective tools in opening sinus drainage passageways.  Their use should be restricted to 3 days though due to the high risk of nasal addiction.  Pseudoephedrine or phenylephrine (Sudafed) is often helpful but it can cause elevations in blood pressure and insomnia.     Sometimes a nasal saline spray will help rinse out the nasal passages and feel good.     Guaifenesin (Robitussin or Mucinex) helps loosen secretions and often help make the mucous more liquid and easier to clear.    For pain and fevers, acetaminophen (Tylenol) is most appropriate.  Ibuprofen (Advil) or naproxen (Aleve) are useful too and last longer but they can cause elevation of blood pressure or stomach problems.    Antihistamines (Benadryl, Dimetapp, etc.) cause sedation, confusion, bowel and urinary abnormalities and are of little use for infectious causes of cough and nasal congestion.  Their use should be reserved for allergic symptoms.    The body needs to be treated well in order to help heal itself.  Rest as needed.  It is ok to reduce food intake if appetite is poor but it is quite important to maintain/increase fluid intake.  Sinus pressure and infections usually go away on their own with appropriate care.  If symptoms worsen or persist beyond 10 days, an antibiotic might be worth trying to treat a possible bacterial component.

## 2020-06-15 ENCOUNTER — OFFICE VISIT (OUTPATIENT)
Dept: FAMILY MEDICINE | Facility: CLINIC | Age: 35
End: 2020-06-15

## 2020-06-15 VITALS
HEART RATE: 72 BPM | TEMPERATURE: 98.2 F | HEIGHT: 67 IN | SYSTOLIC BLOOD PRESSURE: 110 MMHG | BODY MASS INDEX: 23.29 KG/M2 | DIASTOLIC BLOOD PRESSURE: 68 MMHG | RESPIRATION RATE: 20 BRPM | WEIGHT: 148.4 LBS

## 2020-06-15 DIAGNOSIS — H60.392 INFECTIVE OTITIS EXTERNA, LEFT: Primary | ICD-10-CM

## 2020-06-15 PROCEDURE — 99213 OFFICE O/P EST LOW 20 MIN: CPT | Performed by: FAMILY MEDICINE

## 2020-06-15 RX ORDER — NEOMYCIN SULFATE, POLYMYXIN B SULFATE AND HYDROCORTISONE 10; 3.5; 1 MG/ML; MG/ML; [USP'U]/ML
4 SUSPENSION/ DROPS AURICULAR (OTIC) 4 TIMES DAILY
Qty: 1 BOTTLE | Refills: 0 | Status: SHIPPED | OUTPATIENT
Start: 2020-06-15 | End: 2021-07-01

## 2020-06-15 ASSESSMENT — MIFFLIN-ST. JEOR: SCORE: 1404.73

## 2020-06-15 NOTE — NURSING NOTE
Lynn RAMIREZ Adiel is here for a possible left ear infection. Was prescribed Z-ana luisa, on last day and not helping.    Questioned patient about current smoking habits.  Pt. has never smoked.  PULSE regular  My Chart: declines  CLASSIFICATION OF OVERWEIGHT AND OBESITY BY BMI                        Obesity Class           BMI(kg/m2)  Underweight                                    < 18.5  Normal                                         18.5-24.9  Overweight                                     25.0-29.9  OBESITY                     I                  30.0-34.9                             II                 35.0-39.9  EXTREME OBESITY             III                >40                            Patient's  BMI Body mass index is 23.07 kg/m .  http://hin.nhlbi.nih.gov/menuplanner/menu.cgi  Pre-visit planning  Immunizations - up to date  Colonoscopy -   Mammogram -   Asthma -   PHQ9 -    CODY-7 -

## 2020-06-15 NOTE — PROGRESS NOTES
(S) 35 year old female complains of pain in left  for 4-5 days. No fever or URI symptoms. Has not been swimming.    Pt did virtual visit 4 days ago- was prescribed azithromycin, presumably for OM- not better    Pt notes hearing loss and drainage    (O) She appears well, afebrile. Left ear reveals tenderness of the tragus; debris and inflammation in external canal. TM is not well seen due to debris, but visualized aspects appear normal.    (A) Otitis Externa    (P) Instructed to keep ear dry until better; eardrops per orders, call if persistent pain, swelling or fever, FUV prn.

## 2021-06-30 PROCEDURE — 93005 ELECTROCARDIOGRAM TRACING: CPT

## 2021-06-30 PROCEDURE — 99285 EMERGENCY DEPT VISIT HI MDM: CPT | Mod: 25

## 2021-06-30 ASSESSMENT — MIFFLIN-ST. JEOR: SCORE: 1448.39

## 2021-07-01 ENCOUNTER — HOSPITAL ENCOUNTER (EMERGENCY)
Facility: CLINIC | Age: 36
Discharge: HOME | End: 2021-07-01
Attending: EMERGENCY MEDICINE | Admitting: EMERGENCY MEDICINE
Payer: COMMERCIAL

## 2021-07-01 ENCOUNTER — CARE COORDINATION (OUTPATIENT)
Dept: FAMILY MEDICINE | Facility: CLINIC | Age: 36
End: 2021-07-01

## 2021-07-01 ENCOUNTER — APPOINTMENT (OUTPATIENT)
Dept: GENERAL RADIOLOGY | Facility: CLINIC | Age: 36
End: 2021-07-01
Attending: EMERGENCY MEDICINE
Payer: COMMERCIAL

## 2021-07-01 VITALS
RESPIRATION RATE: 18 BRPM | HEART RATE: 65 BPM | BODY MASS INDEX: 25.11 KG/M2 | WEIGHT: 160 LBS | OXYGEN SATURATION: 98 % | TEMPERATURE: 98.3 F | HEIGHT: 67 IN | SYSTOLIC BLOOD PRESSURE: 126 MMHG | DIASTOLIC BLOOD PRESSURE: 97 MMHG

## 2021-07-01 DIAGNOSIS — R06.02 SOB (SHORTNESS OF BREATH): ICD-10-CM

## 2021-07-01 LAB
ANION GAP SERPL CALCULATED.3IONS-SCNC: 3 MMOL/L (ref 3–14)
BASOPHILS # BLD AUTO: 0 10E9/L (ref 0–0.2)
BASOPHILS NFR BLD AUTO: 0.3 %
BUN SERPL-MCNC: 13 MG/DL (ref 7–30)
CALCIUM SERPL-MCNC: 9.1 MG/DL (ref 8.5–10.1)
CHLORIDE SERPL-SCNC: 106 MMOL/L (ref 94–109)
CO2 SERPL-SCNC: 28 MMOL/L (ref 20–32)
CREAT SERPL-MCNC: 0.8 MG/DL (ref 0.52–1.04)
D DIMER PPP FEU-MCNC: 0.3 UG/ML FEU (ref 0–0.5)
DIFFERENTIAL METHOD BLD: NORMAL
EOSINOPHIL # BLD AUTO: 0.1 10E9/L (ref 0–0.7)
EOSINOPHIL NFR BLD AUTO: 1 %
ERYTHROCYTE [DISTWIDTH] IN BLOOD BY AUTOMATED COUNT: 12 % (ref 10–15)
GFR SERPL CREATININE-BSD FRML MDRD: >90 ML/MIN/{1.73_M2}
GLUCOSE SERPL-MCNC: 96 MG/DL (ref 70–99)
HCT VFR BLD AUTO: 40 % (ref 35–47)
HGB BLD-MCNC: 13.1 G/DL (ref 11.7–15.7)
IMM GRANULOCYTES # BLD: 0 10E9/L (ref 0–0.4)
IMM GRANULOCYTES NFR BLD: 0.3 %
INTERPRETATION ECG - MUSE: NORMAL
LYMPHOCYTES # BLD AUTO: 2.2 10E9/L (ref 0.8–5.3)
LYMPHOCYTES NFR BLD AUTO: 30.5 %
MCH RBC QN AUTO: 30.5 PG (ref 26.5–33)
MCHC RBC AUTO-ENTMCNC: 32.8 G/DL (ref 31.5–36.5)
MCV RBC AUTO: 93 FL (ref 78–100)
MONOCYTES # BLD AUTO: 0.5 10E9/L (ref 0–1.3)
MONOCYTES NFR BLD AUTO: 7.4 %
NEUTROPHILS # BLD AUTO: 4.4 10E9/L (ref 1.6–8.3)
NEUTROPHILS NFR BLD AUTO: 60.5 %
NRBC # BLD AUTO: 0 10*3/UL
NRBC BLD AUTO-RTO: 0 /100
PLATELET # BLD AUTO: 187 10E9/L (ref 150–450)
POTASSIUM SERPL-SCNC: 3.7 MMOL/L (ref 3.4–5.3)
RBC # BLD AUTO: 4.3 10E12/L (ref 3.8–5.2)
SODIUM SERPL-SCNC: 137 MMOL/L (ref 133–144)
TROPONIN I SERPL-MCNC: <0.015 UG/L (ref 0–0.04)
WBC # BLD AUTO: 7.3 10E9/L (ref 4–11)

## 2021-07-01 PROCEDURE — 85379 FIBRIN DEGRADATION QUANT: CPT | Performed by: EMERGENCY MEDICINE

## 2021-07-01 PROCEDURE — 80048 BASIC METABOLIC PNL TOTAL CA: CPT | Performed by: EMERGENCY MEDICINE

## 2021-07-01 PROCEDURE — 85025 COMPLETE CBC W/AUTO DIFF WBC: CPT | Performed by: EMERGENCY MEDICINE

## 2021-07-01 PROCEDURE — 84484 ASSAY OF TROPONIN QUANT: CPT | Performed by: EMERGENCY MEDICINE

## 2021-07-01 PROCEDURE — 71046 X-RAY EXAM CHEST 2 VIEWS: CPT

## 2021-07-01 ASSESSMENT — ENCOUNTER SYMPTOMS
SHORTNESS OF BREATH: 1
BACK PAIN: 1

## 2021-07-01 NOTE — PROGRESS NOTES
Care Coordination Initial Assessment    The patient was seen at [facility] ER on 7/1/2021 with shortness of breath. She was discharged with instructions to follow up with PCP office.    PCP: Simin Laurent    Referral Source:  ED/IP List    Plan:   The patient does have two follow up appointments scheduled where everything will be fully reviewed with the patient about her ER visit.

## 2021-07-01 NOTE — ED PROVIDER NOTES
"  History     Chief Complaint:  Shortness of Breath    The history is provided by the patient.      Lynn Chun is a 36 year old female who presents with shortness of breath. The symptom has been around for the past few days. It occurs every 5th or 6th breath and she fells she can't get a full breath. She also complains of back pain. She notices it more when resting or sitting down than when she is active. She is not on any hormones/birth control and does not have a history blood clots. There is no leg swelling.     Review of Systems   Respiratory: Positive for shortness of breath.    Cardiovascular: Negative for leg swelling.   Musculoskeletal: Positive for back pain.   All other systems reviewed and are negative.    Allergies:  Penicillins     Medications:    Multivitamin     Past Medical History:    Ulnar Neuropathy   Carpal Tunnel Syndrome      Past Surgical History:    Tonsillectomy   [place] Teeth Extraction   Tympanostomy   PE Tubes    Family History:    Mother - HLD, Raynaud's syndrome  Father - MI, Grave's   Sister - Raynaud's Syndrome     Social History:  Patient was unaccompanied to the ED.    Physical Exam     Patient Vitals for the past 24 hrs:   BP Temp Temp src Pulse Resp SpO2 Height Weight   07/01/21 0415 126/97 -- -- 65 18 98 % -- --   07/01/21 0400  141/104 -- -- 70 -- 98 % -- --   07/01/21 0300 136/97 -- -- -- -- 100 % -- --   06/30/21 2342  147/117 98.3  F (36.8  C) Oral 97 18 99 % 1.702 m (5' 7\") 72.6 kg (160 lb)     Physical Exam  Nursing note and vitals reviewed.  Constitutional: Cooperative. Resting comfortably.   HENT:   Mouth/Throat: Mucous membranes are normal.   Cardiovascular: Normal rate, regular rhythm and normal heart sounds.  No murmur.  Pulmonary/Chest: Effort normal and breath sounds normal. No respiratory distress. No wheezes. No rales.   Abdominal: Soft. Normal appearance. There is no tenderness.   Musculoskeletal: No LE edmea  Neurological: Alert. Oriented x4  Skin: Skin is " warm and dry. No rash noted.   Psychiatric: Normal mood and affect.     Emergency Department Course   ECG:  ECG taken at 2532, ECG read at 2354  Normal Sinus Rhythm with sinus arrhythmia   Rightward [place]   Rate 77 bpm. UT interval 156 ms. QRS duration 88 ms. QT/QTc 382/432 ms. P-R-T axes 74 93 41.     Imaging:  Chest XR,  PA & LAT  Final Result  IMPRESSION: Cardiomediastinal silhouette within normal limits. No focal consolidation or pleural effusion.  Per Radiology     Laboratory:  Ddimer: 0.3    BMP: WNL (Creatinine 0.8)   CBC: WBC 7.3, HGB 13.1,     Troponin (Collected 0022): <0.015    Reviewed:  I reviewed nursing notes, vitals, past history and care everywhere    Assessments:  0342 I obtained history and examined the patient as noted above.    I rechecked the patient and explained findings.     Disposition:  The patient was discharged to home.    Impression & Plan      Medical Decision Making:  year old female who presents for evaluation of shortness of breath without other associated symptoms  The workup here in the emergency room was to evaluate for infections, metabolic, electrolyte, neurologic, muscle or cardiovascular causes.  Of note, there are no signs of pneumonia or UTI causing the symptoms.  In addition, I do not believe symptoms are due to CVA, TIA, myasthesia gravis, myopathy or acute coronary syndromes and there are no indications at this point for a further workup with a benign history, exam and laboratory tests.  Doubt spinal pathology or other central etiology of symptoms.  Return for any additional symptoms or worsening weakness.      Diagnosis:    ICD-10-CM    1. SOB (shortness of breath)  R06.02        Scribe Disclosure:  I, Alfie Valle, am serving as a scribe at 3:41 AM on 7/1/2021 to document services personally performed by Vamshi Magallanes MD based on my observations and the provider's statements to me.      Vamshi Magallanes MD  07/01/21 6075

## 2021-07-01 NOTE — ED TRIAGE NOTES
Here for concern of feeling hard to take a full breath that usually occurs more when patient is resting for about 5-6 days associated with left upper back pressure. ABCs intact.

## 2021-07-02 ENCOUNTER — OFFICE VISIT (OUTPATIENT)
Dept: FAMILY MEDICINE | Facility: CLINIC | Age: 36
End: 2021-07-02

## 2021-07-02 VITALS
HEART RATE: 102 BPM | OXYGEN SATURATION: 98 % | BODY MASS INDEX: 25.06 KG/M2 | WEIGHT: 160 LBS | SYSTOLIC BLOOD PRESSURE: 120 MMHG | TEMPERATURE: 98.4 F | DIASTOLIC BLOOD PRESSURE: 80 MMHG

## 2021-07-02 DIAGNOSIS — R00.2 PALPITATIONS: Primary | ICD-10-CM

## 2021-07-02 PROCEDURE — 36415 COLL VENOUS BLD VENIPUNCTURE: CPT | Performed by: FAMILY MEDICINE

## 2021-07-02 PROCEDURE — 99214 OFFICE O/P EST MOD 30 MIN: CPT | Performed by: FAMILY MEDICINE

## 2021-07-02 PROCEDURE — 84443 ASSAY THYROID STIM HORMONE: CPT | Mod: 90 | Performed by: FAMILY MEDICINE

## 2021-07-02 NOTE — PROGRESS NOTES
"    Assessment & Plan   Problem List Items Addressed This Visit     None      Visit Diagnoses     Palpitations    -  Primary    Relevant Orders    TSH WITH FREE T4 REFLEX ([facility])    VENOUS COLLECTION (Completed)    Leadless EKG Monitor 3 to 7 Days    CARDIOLOGY EVAL ADULT REFERRAL           1. Palpitations  Check tsh, her breathing is ok, workup was done. zio patch ordered then followup with cardiology.  - TSH WITH FREE T4 REFLEX ([facility])  - VENOUS COLLECTION  - Leadless EKG Monitor 3 to 7 Days; Future  - CARDIOLOGY EVAL ADULT REFERRAL; [id number]}     BMI:   Estimated body mass index is 25.06 kg/m  as calculated from the following:    Height as of 6/30/21: 1.702 m (5' 7\").    Weight as of this encounter: 72.6 kg (160 lb).       FUTURE APPOINTMENTS:       - Follow-up visit per results, also followup with cardiology.    No follow-ups on file.    Ayla Ellington MD  [facility]    Subjective   Homa is a 36 year old who presents for the following health issues     HPI     Here for er followup. Went in for shortness of breath. Is very aware of breathing and heartbeat. Feels like she has to yawn. When she was pregnant had some heart beat issues.  Daughter born 2016. Since then 2-3x/year gets palpitations. Will suddenly get 140 bmp and then it goes away. Hasn't gone in, about every 6 months or so.   Swims and walks, also circuit training regularly. Feels initially during this feels like her  Heart is speeding up but when swimming notices it less.  Not feeling like there is extraordinary anxiety in her life.      ED/UC Followup:    Facility:  [facility] ER  Date of visit: 7/1/2021  Reason for visit: SOB  Current Status: stable            Review of Systems   Constitutional, HEENT, cardiovascular, pulmonary, gi and gu systems are negative, except as otherwise noted.      Objective    /80 (BP Location: Left arm, Patient Position: Sitting, Cuff Size: Adult Large)   Pulse 102   Temp 98.4  F (36.9 "  C)   Wt 72.6 kg (160 lb)   LMP 06/20/2021   SpO2 98%   BMI 25.06 kg/m    Body mass index is 25.06 kg/m .  Physical Exam   GENERAL: healthy, alert and no distress  NECK: no adenopathy, no asymmetry, masses, or scars and thyroid normal to palpation  RESP: lungs clear to auscultation - no rales, rhonchi or wheezes  CV: regular rate and rhythm, normal S1 S2, no S3 or S4, no murmur, click or rub, no peripheral edema and peripheral pulses strong  ABDOMEN: soft, nontender, no hepatosplenomegaly, no masses and bowel sounds normal  MS: no gross musculoskeletal defects noted, no edema  NEURO: Normal strength and tone, mentation intact and speech normal  PSYCH: mentation appears normal, affect normal/bright    No results found for this or any previous visit (from the past 24 hour(s)).

## 2021-07-02 NOTE — LETTER
July 6, 2021      Lynn JAMES Chun  [address] [id number]        Dear ,    We are writing to inform you of your test results.    Your thyroid, tsh was in range, normal.    Resulted Orders   TSH WITH FREE T4 REFLEX ([facility])   Result Value Ref Range    TSH 0.69 mIU/L      Comment:                Reference Range                         > or = 20 Years  0.40-4.50                              Pregnancy Ranges            First trimester    0.26-2.66            Second trimester   0.55-2.73            Third trimester    0.43-2.91      Narrative    FASTING: UNKNOWN       If you have any questions or concerns, please call the clinic at the number listed above.       Sincerely,      Ayla Ellington MD

## 2021-07-02 NOTE — PATIENT INSTRUCTIONS
"Assessment & Plan   Problem List Items Addressed This Visit     None      Visit Diagnoses     Palpitations    -  Primary    Relevant Orders    TSH WITH FREE T4 REFLEX ([facility])    VENOUS COLLECTION (Completed)    Leadless EKG Monitor 3 to 7 Days    CARDIOLOGY EVAL ADULT REFERRAL           1. Palpitations  Check tsh, her breathing is ok, workup was done. zio patch ordered then followup with cardiology.  - TSH WITH FREE T4 REFLEX ([facility])  - VENOUS COLLECTION  - Leadless EKG Monitor 3 to 7 Days; Future  - CARDIOLOGY EVAL ADULT REFERRAL; [id number]}     BMI:   Estimated body mass index is 25.06 kg/m  as calculated from the following:    Height as of 6/30/21: 1.702 m (5' 7\").    Weight as of this encounter: 72.6 kg (160 lb).       FUTURE APPOINTMENTS:       - Follow-up visit per results, also followup with cardiology.    No follow-ups on file.    Ayla Ellington MD  [facility]    "

## 2021-07-03 LAB — TSH SERPL-ACNC: 0.69 MIU/L

## 2021-07-07 ENCOUNTER — OFFICE VISIT (OUTPATIENT)
Dept: FAMILY MEDICINE | Facility: CLINIC | Age: 36
End: 2021-07-07

## 2021-07-07 VITALS
OXYGEN SATURATION: 99 % | HEART RATE: 84 BPM | SYSTOLIC BLOOD PRESSURE: 104 MMHG | DIASTOLIC BLOOD PRESSURE: 64 MMHG | BODY MASS INDEX: 24.96 KG/M2 | HEIGHT: 67 IN | WEIGHT: 159 LBS | TEMPERATURE: 98 F

## 2021-07-07 DIAGNOSIS — Z01.419 ENCOUNTER FOR GYNECOLOGICAL EXAMINATION WITHOUT ABNORMAL FINDING: Primary | ICD-10-CM

## 2021-07-07 DIAGNOSIS — F41.1 GENERALIZED ANXIETY DISORDER: ICD-10-CM

## 2021-07-07 DIAGNOSIS — Z12.4 SCREENING FOR CERVICAL CANCER: ICD-10-CM

## 2021-07-07 DIAGNOSIS — Z13.220 SCREENING FOR LIPID DISORDERS: ICD-10-CM

## 2021-07-07 LAB
CHOLEST SERPL-MCNC: 195 MG/DL (ref 0–199)
CHOLEST/HDLC SERPL: 3 {RATIO} (ref 0–5)
HDLC SERPL-MCNC: 73 MG/DL (ref 40–150)
LDLC SERPL CALC-MCNC: 105 MG/DL (ref 0–130)
TRIGL SERPL-MCNC: 87 MG/DL (ref 0–149)

## 2021-07-07 PROCEDURE — 88142 CYTOPATH C/V THIN LAYER: CPT | Mod: 90 | Performed by: PHYSICIAN ASSISTANT

## 2021-07-07 PROCEDURE — 80061 LIPID PANEL: CPT | Performed by: PHYSICIAN ASSISTANT

## 2021-07-07 PROCEDURE — 87624 HPV HI-RISK TYP POOLED RSLT: CPT | Mod: 90 | Performed by: PHYSICIAN ASSISTANT

## 2021-07-07 PROCEDURE — 99395 PREV VISIT EST AGE 18-39: CPT | Performed by: PHYSICIAN ASSISTANT

## 2021-07-07 PROCEDURE — 36415 COLL VENOUS BLD VENIPUNCTURE: CPT | Performed by: PHYSICIAN ASSISTANT

## 2021-07-07 SDOH — HEALTH STABILITY: MENTAL HEALTH: HOW OFTEN DO YOU HAVE 6 OR MORE DRINKS ON ONE OCCASION?: NEVER

## 2021-07-07 SDOH — HEALTH STABILITY: MENTAL HEALTH: HOW OFTEN DO YOU HAVE A DRINK CONTAINING ALCOHOL?: 2-4 TIMES A MONTH

## 2021-07-07 SDOH — HEALTH STABILITY: MENTAL HEALTH: HOW MANY STANDARD DRINKS CONTAINING ALCOHOL DO YOU HAVE ON A TYPICAL DAY?: 1 OR 2

## 2021-07-07 ASSESSMENT — ANXIETY QUESTIONNAIRES
2. NOT BEING ABLE TO STOP OR CONTROL WORRYING: MORE THAN HALF THE DAYS
3. WORRYING TOO MUCH ABOUT DIFFERENT THINGS: MORE THAN HALF THE DAYS
1. FEELING NERVOUS, ANXIOUS, OR ON EDGE: MORE THAN HALF THE DAYS
IF YOU CHECKED OFF ANY PROBLEMS ON THIS QUESTIONNAIRE, HOW DIFFICULT HAVE THESE PROBLEMS MADE IT FOR YOU TO DO YOUR WORK, TAKE CARE OF THINGS AT HOME, OR GET ALONG WITH OTHER PEOPLE: SOMEWHAT DIFFICULT
5. BEING SO RESTLESS THAT IT IS HARD TO SIT STILL: SEVERAL DAYS
6. BECOMING EASILY ANNOYED OR IRRITABLE: SEVERAL DAYS
GAD7 TOTAL SCORE: 12
7. FEELING AFRAID AS IF SOMETHING AWFUL MIGHT HAPPEN: MORE THAN HALF THE DAYS

## 2021-07-07 ASSESSMENT — PATIENT HEALTH QUESTIONNAIRE - PHQ9
5. POOR APPETITE OR OVEREATING: MORE THAN HALF THE DAYS
SUM OF ALL RESPONSES TO PHQ QUESTIONS 1-9: 3

## 2021-07-07 ASSESSMENT — MIFFLIN-ST. JEOR: SCORE: 1447.81

## 2021-07-07 NOTE — PROGRESS NOTES
Chief Complaint:  Physical Exam    SUBJECTIVE:   Lynn Chun is a 36 year old female presents for routine health maintenance.    Current concerns: Darryl scheduled for attachment 7/16/2021.    Does have some general anxiety that comes and goes. Feel like it got worse after having kids. No panic attacks, but wonders if this could be contributing to her palpitations. These are getting better, but still noticeable.     Menses are regular    Patient's last menstrual period was 06/20/2021.     Was last Pap smear normal: Yes  Due for mammogram:  No    Body mass index is 24.72 kg/m .    Present exercise habits:  3 times/week gym, walks almost every day  Present dietary habits:  eats regular meals and follows a balanced nutrition diet    Calcium intake: 1-2 servings plus multivitamin   Vit D intake: is taking supplement    Is the patient a smoker? No  If yes, smoking cessation advised and counseling provided.     Cardiovascular risk factors: none    Over the past few weeks, have you felt down or depressed? Little interest or pleasure in doing things? No concerns    If in a relationship are there any Domestic violence concern: No    Last dental appointment:  this year  Last optical appointment:  last year    Was the patient born between [id number] and has not had Hep C testing?  No, not applicable    I have reviewed the following histories: Past Medical History, Past Surgical History, Social History, Family History, Problem List, Medication List and Allergies    No past medical history on file.  Family History   Problem Relation Age of Onset     Hyperlipidemia Mother      Connective Tissue Disorder Mother         raynaud's     Hypothyroidism Father 55        Graves     Coronary Artery Disease Father 58        stent     Hyperlipidemia Maternal Grandmother      Lymphoma Paternal Grandmother      Connective Tissue Disorder Sister         raynaud's     Cancer Paternal Grandfather         unknown     Breast Cancer No  family hx of      Colon Cancer No family hx of      Social History     Socioeconomic History     Marital status:      Spouse name: Not on file     Number of children: 2     Years of education: Not on file     Highest education level: Not on file   Occupational History     Not on file   Social Needs     Financial resource strain: Not on file     Food insecurity     Worry: Not on file     Inability: Not on file     Transportation needs     Medical: Not on file     Non-medical: Not on file   Tobacco Use     Smoking status: Never Smoker     Smokeless tobacco: Never Used   Substance and Sexual Activity     Alcohol use: Yes     Alcohol/week: 1.0 standard drinks     Types: 1 Standard drinks or equivalent per week     Frequency: 2-4 times a month     Drinks per session: 1 or 2     Binge frequency: Never     Drug use: No     Sexual activity: Yes     Partners: Male     Birth control/protection: Male Surgical   Lifestyle     Physical activity     Days per week: Not on file     Minutes per session: Not on file     Stress: Not on file   Relationships     Social connections     Talks on phone: Not on file     Gets together: Not on file     Attends [employer]ism service: Not on file     Active member of club or organization: Not on file     Attends meetings of clubs or organizations: Not on file     Relationship status: Not on file     Intimate partner violence     Fear of current or ex partner: Not on file     Emotionally abused: Not on file     Physically abused: Not on file     Forced sexual activity: Not on file   Other Topics Concern     Not on file   Social History Narrative     Not on file     Past Surgical History:   Procedure Laterality Date     EXTRACTION(S) DENTAL  age 25     PE TUBES  age 7     TONSILLECTOMY  age 7       ROS:  E/M: NEGATIVE for ear, nose, mouth and throat problems  R: NEGATIVE for significant/chronic cough or SOB  CV: NEGATIVE for chest pain or palpitations  GI: NEGATIVE for abdominal pain, chronic  "diarrhea or constipation  :  NEGATIVE for dysuria, hematuria or vaginal discharge. No sexual health concerns.       Current Outpatient Medications   Medication     Multiple Vitamins-Minerals (MULTIVITAMIN ADULT) TABS     sertraline (ZOLOFT) 50 MG tablet     No current facility-administered medications for this visit.        Patient Active Problem List    Diagnosis Date Noted     Cramps, extremity 05/23/2018     Priority: Medium     Ulnar neuropathy 12/15/2017     Priority: Medium     CTS (carpal tunnel syndrome) 12/15/2017     Priority: Medium     Family history of thyroid disease in father 12/01/2017     Priority: Medium     Health Care Home 12/01/2017     Priority: Low     ACP (advance care planning) 12/01/2017     Priority: Low     Advance Care Planning 12/1/2017: ACP Review of Chart / Resources Provided:  Reviewed chart for advance care plan.  Lynn RAMIREZ [place] has no plan or code status on file. Discussed available resources and provided with information.   Added by Lo Hammonds                 OBJECTIVE:  /64 (BP Location: Left arm, Patient Position: Sitting, Cuff Size: Adult Large)   Pulse 84   Temp 98  F (36.7  C) (Temporal)   Ht 1.708 m (5' 7.25\")   Wt 72.1 kg (159 lb)   LMP 06/20/2021   SpO2 99%   BMI 24.72 kg/m        General: 36 year old female who appears her stated age. Vital signs noted  Head: Normocephalic  Eyes: pupils equal round reactive to light and accomodation, extra ocular movements intact  Ears: external canals and TMs free of abnormalities  Nose: patent, without mucosal abnormalities  Mouth and throat: without erythema or lesions of the mucosa  Neck: supple, without adenopathy or thyromegaly  Lungs: clear to auscultation, no wheezing or crackles  Breasts: skin without rash, no dominant mass, no nipple discharge, or axillary adenopathy  Cv: regular rate and rhythm, normal s1 and s2 without murmur or click  Abd: soft, non-tender, no masses, no hepatomegaly or splenomegaly.   " "(female): normal female external genitalia, normal urethral meatus, vaginal mucosa, normal cervix/adnexa/uterus without masses or discharge  Ms: normal muscle tone & symmetry  Skin: clear to inspection and with no palpable abnormalities.  Neuro: sensation and motor function grossly intact; cranial nerves without obvious abnormalities.    ASSESSMENT/PLAN:    1. Encounter for gynecological examination without abnormal finding  Homa is doing well today other than anxiety discussed below. Will check lipid panel today, as this has not been checked. Will also update pap today and send MyChart with results when available.     2. Screening for lipid disorders  - VENOUS COLLECTION  - Lipid Panel ([facility])    3. Screening for cervical cancer  - ThinPrep Pap and HPV (mRNa E6/E7) HPV always run([facility])    4. Generalized anxiety disorder  CODY-7 and PHQ-9 today completed, and CODY score 12 consistent with mild CODY. Agreed to start trial of sertraline 50 mg daily, which has worked in the past for her mom. Take in the morning or night, take with food. Monitor for side effects, including sleep changes, worsening depression, weight changes, GI issues, sexual changes, and contact me if noted. Otherwise, filled for 2 months and should schedule an appt at that time to discuss medication, symptoms. Avoid alcohol while taking. Would ideally wait until after [facility]iop[facility] to start medication as it may interfere with results, especially if palpitations are being triggered by anxiety.   - sertraline (ZOLOFT) 50 MG tablet; Take 1 tablet (50 mg) by mouth daily  Dispense: 30 tablet; Refill: 1     reports that she has never smoked. She has never used smokeless tobacco.      Estimated body mass index is 24.72 kg/m  as calculated from the following:    Height as of this encounter: 1.708 m (5' 7.25\").    Weight as of this encounter: 72.1 kg (159 lb).      Labs pending:       Fasting lipids  Meds Suggested:      Vitamin D       Calcium  Tests Recommended:     "  Regular Dental Examinations        Eye exam  Behavior Modifications:       Cardiovascular exercise 3 times per week--enough to get your Target Heart rate  Other recommendations:     BMI noted and discussed      Regular breast exam     Encouraged My Chart    Counseling Resources:  ATP IV Guidelines  Pooled Cohorts Equation Calculator  Breast Cancer Risk Calculator  FRAX Risk Assessment  ICSI Preventive Guidelines  Dietary Guidelines for Americans, 2010  [employer] MyPlate          Maribell Simpson PA-C  7/7/2021

## 2021-07-07 NOTE — NURSING NOTE
Chief Complaint   Patient presents with     Physical     fasting       Pre-visit Screening:  Immunizations:  up to date  Colonoscopy:  NA  Mammogram: NA  Asthma Action Test/Plan:  NA  PHQ9:  NA  GAD7:  NA  Questioned patient about current smoking habits Pt. has never smoked.  Ok to leave detailed message on voice mail for today's visit only Yes, phone # cell

## 2021-07-08 ASSESSMENT — ANXIETY QUESTIONNAIRES: GAD7 TOTAL SCORE: 12

## 2021-07-10 LAB
CLINICAL HISTORY - QUEST: NORMAL
COMMENT - QUEST: NORMAL
CYTOTECHNOLOGIST - QUEST: NORMAL
DESCRIPTIVE DIAGNOSIS - QUEST: NORMAL
HPV MRNA E6/E7: NOT DETECTED
LAST PAP DX - QUEST: NORMAL
LMP - QUEST: NORMAL
PREV BX DX - QUEST: NORMAL
SOURCE: NORMAL
STATEMENT OF ADEQUACY - QUEST: NORMAL

## 2021-08-18 ENCOUNTER — TRANSFERRED RECORDS (OUTPATIENT)
Dept: FAMILY MEDICINE | Facility: CLINIC | Age: 36
End: 2021-08-18

## 2021-10-01 ENCOUNTER — OFFICE VISIT (OUTPATIENT)
Dept: FAMILY MEDICINE | Facility: CLINIC | Age: 36
End: 2021-10-01

## 2021-10-01 DIAGNOSIS — F41.1 GENERALIZED ANXIETY DISORDER: Primary | ICD-10-CM

## 2021-10-01 PROCEDURE — 99212 OFFICE O/P EST SF 10 MIN: CPT | Mod: 95 | Performed by: PHYSICIAN ASSISTANT

## 2021-10-01 RX ORDER — ESCITALOPRAM OXALATE 5 MG/1
5 TABLET ORAL DAILY
Qty: 30 TABLET | Refills: 1 | Status: SHIPPED | OUTPATIENT
Start: 2021-10-01 | End: 2021-12-08 | Stop reason: DRUGHIGH

## 2021-10-01 NOTE — PROGRESS NOTES
This visit was completed via telemedicine using [employer] including audio and video. Patient consents to being seen virtually and understands this will be billed as an office visit.     Provider physical location: [facility]  Patient physical location: House    Patient and I completed history, ROS, and HPI via virtual encounter with video/sound.       There was no physical examination done due to telemedicine appointment.     CC: Medication Check      History:  CODY:  Homa is here today to f/u on anxiety. Was dicussed at her physical 7/2021.Tried sertraline 50 mg and had diarrhea so stopped. Has been having intermittent shortness of breath at least once a day. No clear pattern to it. Not interested in seeing therapist at this time. Would be open to additional medication trial.     PMH, MEDICATIONS, ALLERGIES, SOCIAL AND FAMILY HISTORY in EPIC and reviewed by me personally.    ROS negative other than the symptoms noted above in the HPI.        Examination   LMP 09/10/2021        Constitutional: Sitting comfortably, in no acute distress. Vital signs noted  Psychiatric: mentation appears normal and affect normal/bright        A/P    ICD-10-CM    1. Generalized anxiety disorder  F41.1 escitalopram (LEXAPRO) 5 MG tablet       DISCUSSION:  CODY:  Symptoms still consistent with generalized anxiety. Given that she had side effects with sertraline, agreed to start trial of escitalopram 5 mg daily. Take in the morning or night, take with food. Monitor for side effects, including sleep changes, worsening depression, weight changes, GI issues, sexual changes, and contact me if noted. Otherwise, filled for 2 months and should schedule an appt at that time to discuss medication, symptoms. Avoid alcohol while taking.  Contact me with any concerns/worsening. Proceed to ER with any significant worsening.     follow up visit: 2 months    Time of visit: 10 minutes    Maribell Simpson PA-C  [facility]

## 2021-10-01 NOTE — NURSING NOTE
Chief Complaint   Patient presents with     Recheck Medication     discuss anxiety/depression medication         Pre-visit Screening:  Immunizations:  up to date  Colonoscopy:  NA  Mammogram: NA  Asthma Action Test/Plan:  NA  PHQ9:  Done today via [employer]  GAD7:  Done today via [employer]  Questioned patient about current smoking habits Pt. has never smoked.  Ok to leave detailed message on voice mail for today's visit only Yes, phone # [phone]

## 2021-10-06 ENCOUNTER — OFFICE VISIT (OUTPATIENT)
Dept: FAMILY MEDICINE | Facility: CLINIC | Age: 36
End: 2021-10-06

## 2021-10-06 VITALS
HEIGHT: 67 IN | TEMPERATURE: 98.7 F | BODY MASS INDEX: 24.96 KG/M2 | DIASTOLIC BLOOD PRESSURE: 74 MMHG | OXYGEN SATURATION: 97 % | HEART RATE: 92 BPM | WEIGHT: 159 LBS | SYSTOLIC BLOOD PRESSURE: 108 MMHG

## 2021-10-06 DIAGNOSIS — N39.0 URINARY TRACT INFECTION WITHOUT HEMATURIA, SITE UNSPECIFIED: ICD-10-CM

## 2021-10-06 DIAGNOSIS — R10.84 ABDOMINAL PAIN, GENERALIZED: ICD-10-CM

## 2021-10-06 DIAGNOSIS — R39.15 URINARY URGENCY: Primary | ICD-10-CM

## 2021-10-06 LAB
% GRANULOCYTES: 53.8 %
APPEARANCE UR: ABNORMAL
BACTERIA, UR: ABNORMAL
BILIRUB UR QL: ABNORMAL
CASTS/LPF: ABNORMAL
COLOR UR: YELLOW
EP/HPF: ABNORMAL
GLUCOSE URINE: ABNORMAL MG/DL
HCT VFR BLD AUTO: 39.9 % (ref 35–47)
HEMOGLOBIN: 13.4 G/DL (ref 11.7–15.7)
HGB UR QL: ABNORMAL
KETONES UR QL: ABNORMAL MG/DL
LYMPHOCYTES NFR BLD AUTO: 39.3 %
MCH RBC QN AUTO: 30.9 PG (ref 26–33)
MCHC RBC AUTO-ENTMCNC: 33.6 G/DL (ref 31–36)
MCV RBC AUTO: 92.2 FL (ref 78–100)
MISC.: ABNORMAL
MONOCYTES NFR BLD AUTO: 6.9 %
NITRITE UR QL STRIP: ABNORMAL
PH UR STRIP: 5.5 PH (ref 5–7)
PLATELET COUNT - QUEST: 225 10^9/L (ref 150–375)
PROT UR QL: ABNORMAL MG/DL
RBC # BLD AUTO: 4.33 10*12/L (ref 3.8–5.2)
RBC, UR MICRO: ABNORMAL (ref ?–2)
SP GR UR STRIP: 1.03 (ref 1–1.03)
UROBILINOGEN UR QL STRIP: 0.2 EU/DL (ref 0.2–1)
WBC # BLD AUTO: 6.2 10*9/L (ref 4–11)
WBC #/AREA URNS HPF: ABNORMAL /[HPF]
WBC, UR MICRO: ABNORMAL (ref ?–2)

## 2021-10-06 PROCEDURE — 36415 COLL VENOUS BLD VENIPUNCTURE: CPT | Performed by: FAMILY MEDICINE

## 2021-10-06 PROCEDURE — 87086 URINE CULTURE/COLONY COUNT: CPT | Mod: 90 | Performed by: FAMILY MEDICINE

## 2021-10-06 PROCEDURE — 81001 URINALYSIS AUTO W/SCOPE: CPT | Performed by: FAMILY MEDICINE

## 2021-10-06 PROCEDURE — 99214 OFFICE O/P EST MOD 30 MIN: CPT | Performed by: FAMILY MEDICINE

## 2021-10-06 PROCEDURE — 80053 COMPREHEN METABOLIC PANEL: CPT | Performed by: FAMILY MEDICINE

## 2021-10-06 PROCEDURE — 85025 COMPLETE CBC W/AUTO DIFF WBC: CPT | Performed by: FAMILY MEDICINE

## 2021-10-06 RX ORDER — CIPROFLOXACIN 500 MG/1
500 TABLET, FILM COATED ORAL 2 TIMES DAILY
Qty: 14 TABLET | Refills: 0 | Status: SHIPPED | OUTPATIENT
Start: 2021-10-06 | End: 2021-10-13

## 2021-10-06 ASSESSMENT — MIFFLIN-ST. JEOR: SCORE: 1447.81

## 2021-10-06 NOTE — PROGRESS NOTES
Assessment & Plan   Problem List Items Addressed This Visit     None      Visit Diagnoses     Urinary urgency    -  Primary    Relevant Orders    URINALYSIS, ROUTINE ([facility]) (Completed)    HEMOGRAM PLATELET DIFF ([facility]) (Completed)    VENOUS COLLECTION (Completed)    Comprehensive Metobolic Panel ([facility])    URINE CULTURE AEROBIC BACTERIAL ([facility])    Urinary tract infection without hematuria, site unspecified        Relevant Medications    ciprofloxacin (CIPRO) 500 MG tablet    Abdominal pain, generalized             1. Urinary urgency    - URINALYSIS, ROUTINE ([facility])  - HEMOGRAM PLATELET DIFF ([facility])  - VENOUS COLLECTION  - Comprehensive Metobolic Panel ([facility])  - URINE CULTURE AEROBIC BACTERIAL ([facility])    2. Urinary tract infection without hematuria, site unspecified  Unclear if the abdominal pain is due to the urinary tract infection, but it certainly is likely. Take cipro x 7 days, followup to recheck Friday or next week if the pain persists. Consider other causes and if after hours or over the weekend, go to the ER. Come in sooner if worse.  - ciprofloxacin (CIPRO) 500 MG tablet; Take 1 tablet (500 mg) by mouth 2 times daily for 7 days  Dispense: 14 tablet; Refill: 0             FUTURE APPOINTMENTS:       - Follow-up visit Friday or Monday    No follow-ups on file.    Ayla Ellington MD  [facility]    Subjective     Nursing Notes:   Charlotte Garcia CMA  10/6/2021 12:42 PM  Signed  Chief Complaint   Patient presents with     Abdominal Pain     abdominal pain on and off for 2 weeks, constipation for a few days then normal stools, low grade fever for the last week, tenderness and bloating to abdomen, urge to urinate      Pre-visit Screening:  Immunizations:  up to date  Colonoscopy:  NA  Mammogram: NA  Asthma Action Test/Plan:  NA  PHQ9:  NA  GAD7:  NA  Questioned patient about current smoking habits Pt. has never smoked.  Ok to leave detailed message on voice mail for today's visit only Yes, phone #  "[phone]           Lynn Chun is a 36 year old female who presents to clinic today for the following health issues   HPI     Has been vaccinated for covid. Neg covid test on 10.01    For a few weeks--2 1/2, abd pain, moves around, then over the past week fever to 99.5, then over the past 2 days, urinary sx.   Chances of pregnancy, none due to  with vasectomy. Doesn't want upt checked. Feels like between constipation but then a few days later large soft bm.  Eating and drinking--less appetite because the stomach feels full. Grandmother had diverticulitis.        Review of Systems   Constitutional, HEENT, cardiovascular, pulmonary, gi and gu systems are negative, except as otherwise noted.      Objective    /74 (BP Location: Left arm, Patient Position: Sitting, Cuff Size: Adult Regular)   Pulse 92   Temp 98.7  F (37.1  C) (Temporal)   Ht 1.708 m (5' 7.25\")   Wt 72.1 kg (159 lb)   LMP 09/10/2021   SpO2 97%   BMI 24.72 kg/m    Body mass index is 24.72 kg/m .  Physical Exam   GENERAL: healthy, alert and no distress  RESP: lungs clear to auscultation - no rales, rhonchi or wheezes  CV: regular rate and rhythm, normal S1 S2, no S3 or S4, no murmur, click or rub, no peripheral edema and peripheral pulses strong  ABDOMEN: soft, nontender, no hepatosplenomegaly, no masses and bowel sounds normal  MS: no gross musculoskeletal defects noted, no edema  NEURO: Normal strength and tone, mentation intact and speech normal  PSYCH: mentation appears normal, affect normal/bright    Results for orders placed or performed in visit on 10/06/21   URINALYSIS, ROUTINE ([facility])     Status: Abnormal   Result Value Ref Range    Color Urine Yellow     Appearance Urine Slightly Cloudy (A)     Glucose Urine Neg neg mg/dL    Bilirubin Urine Neg neg    Ketones Urine Neg neg mg/dL    Specific Gravity Urine 1.030 1.003 - 1.035    Blood Urine Mod (A) neg    pH Urine 5.5 5.0 - 7.0 pH    Protein Urine neg neg - neg mg/dL    " Urobilinogen Urine 0.2 0.2 - 1.0 EU/dL    Nitrite Urine Neg NEG    Leukocytes trace (A)     Wbc, Urine Micro 1-3 (A) neg - 2    RBC Micro Urine 2-4 (A) neg - 2    EP/HPF neg     Bacteria Urine mod (A) neg - neg    Casts/LPF neg     Miscellaneous lg mucus strands (A)    HEMOGRAM PLATELET DIFF ([facility])     Status: None   Result Value Ref Range    WBC 6.2 4.0 - 11 10*9/L    RBC Count 4.33 3.8 - 5.2 10*12/L    Hemoglobin 13.4 11.7 - 15.7 g/dL    Hematocrit 39.9 35.0 - 47.0 %    MCV 92.2 78 - 100 fL    MCH 30.9 26 - 33 pg    MCHC 33.6 31 - 36 g/dL    Platelet Count 225 150 - 375 10^9/L    % Granulocytes 53.8 %    % Lymphocytes 39.3 %    % Monocytes 6.9 %

## 2021-10-06 NOTE — NURSING NOTE
Chief Complaint   Patient presents with     Abdominal Pain     abdominal pain on and off for 2 weeks, constipation for a few days then normal stools, low grade fever for the last week, tenderness and bloating to abdomen, urge to urinate      Pre-visit Screening:  Immunizations:  up to date  Colonoscopy:  NA  Mammogram: NA  Asthma Action Test/Plan:  NA  PHQ9:  NA  GAD7:  NA  Questioned patient about current smoking habits Pt. has never smoked.  Ok to leave detailed message on voice mail for today's visit only Yes, phone # [phone]

## 2021-10-06 NOTE — PATIENT INSTRUCTIONS
Assessment & Plan   Problem List Items Addressed This Visit     None      Visit Diagnoses     Urinary urgency    -  Primary    Relevant Orders    URINALYSIS, ROUTINE ([facility]) (Completed)    HEMOGRAM PLATELET DIFF ([facility]) (Completed)    VENOUS COLLECTION (Completed)    Comprehensive Metobolic Panel ([facility])    URINE CULTURE AEROBIC BACTERIAL ([facility])    Urinary tract infection without hematuria, site unspecified        Relevant Medications    ciprofloxacin (CIPRO) 500 MG tablet    Abdominal pain, generalized             1. Urinary urgency    - URINALYSIS, ROUTINE ([facility])  - HEMOGRAM PLATELET DIFF ([facility])  - VENOUS COLLECTION  - Comprehensive Metobolic Panel ([facility])  - URINE CULTURE AEROBIC BACTERIAL ([facility])    2. Urinary tract infection without hematuria, site unspecified  Unclear if the abdominal pain is due to the urinary tract infection, but it certainly is likely. Take cipro x 7 days, followup to recheck Friday or next week if the pain persists. Consider other causes and if after hours or over the weekend, go to the ER. Come in sooner if worse.  - ciprofloxacin (CIPRO) 500 MG tablet; Take 1 tablet (500 mg) by mouth 2 times daily for 7 days  Dispense: 14 tablet; Refill: 0             FUTURE APPOINTMENTS:       - Follow-up visit Friday or Monday    No follow-ups on file.    Ayla Ellington MD  [facility]

## 2021-10-07 LAB
ALBUMIN SERPL-MCNC: 4.6 G/DL (ref 3.6–5.1)
ALBUMIN/GLOB SERPL: 1.5 {RATIO} (ref 1–2.5)
ALP SERPL-CCNC: 49 U/L (ref 33–130)
ALT 1742-6: 8 U/L (ref 0–32)
AST 1920-8: 11 U/L (ref 0–35)
BILIRUB SERPL-MCNC: 0.4 MG/DL (ref 0.2–1.2)
BUN SERPL-MCNC: 10 MG/DL (ref 7–25)
BUN/CREATININE RATIO: 12.7 (ref 6–22)
CALCIUM SERPL-MCNC: 9.9 MG/DL (ref 8.6–10.3)
CHLORIDE SERPLBLD-SCNC: 102.9 MMOL/L (ref 98–110)
CO2 SERPL-SCNC: 29.6 MMOL/L (ref 20–32)
CREAT SERPL-MCNC: 0.79 MG/DL (ref 0.6–1.3)
GLOBULIN, CALCULATED - QUEST: 3.1 (ref 1.9–3.7)
GLUCOSE SERPL-MCNC: 100 MG/DL (ref 60–99)
POTASSIUM SERPL-SCNC: 4.29 MMOL/L (ref 3.5–5.3)
PROT SERPL-MCNC: 7.7 G/DL (ref 6.1–8.1)
SODIUM SERPL-SCNC: 140 MMOL/L (ref 135–146)

## 2021-10-08 LAB — URINE VOIDED CULTURE: NORMAL

## 2021-10-09 ENCOUNTER — HEALTH MAINTENANCE LETTER (OUTPATIENT)
Age: 36
End: 2021-10-09

## 2021-10-11 ENCOUNTER — OFFICE VISIT (OUTPATIENT)
Dept: FAMILY MEDICINE | Facility: CLINIC | Age: 36
End: 2021-10-11

## 2021-10-11 VITALS
TEMPERATURE: 98 F | BODY MASS INDEX: 24.96 KG/M2 | SYSTOLIC BLOOD PRESSURE: 104 MMHG | HEIGHT: 67 IN | HEART RATE: 72 BPM | DIASTOLIC BLOOD PRESSURE: 74 MMHG | WEIGHT: 159 LBS | OXYGEN SATURATION: 98 %

## 2021-10-11 DIAGNOSIS — R39.15 URINARY URGENCY: ICD-10-CM

## 2021-10-11 DIAGNOSIS — R10.2 PELVIC PAIN IN FEMALE: Primary | ICD-10-CM

## 2021-10-11 LAB
% GRANULOCYTES: 54 %
APPEARANCE UR: CLEAR
BACTERIA, UR: ABNORMAL
BILIRUB UR QL: ABNORMAL
CASTS/LPF: ABNORMAL
COLOR UR: YELLOW
EP/HPF: ABNORMAL
GLUCOSE URINE: ABNORMAL MG/DL
HCT VFR BLD AUTO: 41.9 % (ref 35–47)
HEMOGLOBIN: 14.1 G/DL (ref 11.7–15.7)
HGB UR QL: ABNORMAL
KETONES UR QL: ABNORMAL MG/DL
LYMPHOCYTES NFR BLD AUTO: 36.2 %
MCH RBC QN AUTO: 31.3 PG (ref 26–33)
MCHC RBC AUTO-ENTMCNC: 33.7 G/DL (ref 31–36)
MCV RBC AUTO: 93.2 FL (ref 78–100)
MISC.: ABNORMAL
MONOCYTES NFR BLD AUTO: 9.8 %
NITRITE UR QL STRIP: ABNORMAL
PH UR STRIP: 6 PH (ref 5–7)
PLATELET COUNT - QUEST: 169 10^9/L (ref 150–375)
PROT UR QL: ABNORMAL MG/DL
RBC # BLD AUTO: 4.5 10*12/L (ref 3.8–5.2)
RBC, UR MICRO: ABNORMAL (ref ?–2)
SP GR UR STRIP: 1.01 (ref 1–1.03)
UROBILINOGEN UR QL STRIP: 0.2 EU/DL (ref 0.2–1)
WBC # BLD AUTO: 6.2 10*9/L (ref 4–11)
WBC #/AREA URNS HPF: ABNORMAL /[HPF]
WBC, UR MICRO: ABNORMAL (ref ?–2)

## 2021-10-11 PROCEDURE — 85025 COMPLETE CBC W/AUTO DIFF WBC: CPT | Performed by: FAMILY MEDICINE

## 2021-10-11 PROCEDURE — 99214 OFFICE O/P EST MOD 30 MIN: CPT | Performed by: FAMILY MEDICINE

## 2021-10-11 PROCEDURE — 36415 COLL VENOUS BLD VENIPUNCTURE: CPT | Performed by: FAMILY MEDICINE

## 2021-10-11 PROCEDURE — 87086 URINE CULTURE/COLONY COUNT: CPT | Mod: 90 | Performed by: FAMILY MEDICINE

## 2021-10-11 PROCEDURE — 81001 URINALYSIS AUTO W/SCOPE: CPT | Performed by: FAMILY MEDICINE

## 2021-10-11 ASSESSMENT — MIFFLIN-ST. JEOR: SCORE: 1447.81

## 2021-10-11 NOTE — PROGRESS NOTES
Assessment & Plan   Problem List Items Addressed This Visit     None      Visit Diagnoses     Pelvic pain in female    -  Primary    Relevant Orders    URINALYSIS, ROUTINE ([facility]) (Completed)    URINE CULTURE AEROBIC BACTERIAL ([facility])    Radiology Referral    CT Abdomen Pelvis w Contrast    Urinary urgency        Relevant Orders    URINALYSIS, ROUTINE ([facility]) (Completed)    URINE CULTURE AEROBIC BACTERIAL ([facility])    HEMOGRAM PLATELET DIFF ([facility]) (Completed)    VENOUS COLLECTION (Completed)    Radiology Referral    CT Abdomen Pelvis w Contrast         Results for orders placed or performed in visit on 10/11/21   URINALYSIS, ROUTINE ([facility])     Status: Abnormal   Result Value Ref Range    Color Urine Yellow     Appearance Urine Clear     Glucose Urine Neg neg mg/dL    Bilirubin Urine Neg neg    Ketones Urine Neg neg mg/dL    Specific Gravity Urine 1.010 1.003 - 1.035    Blood Urine Trace (A) neg    pH Urine 6.0 5.0 - 7.0 pH    Protein Urine neg neg - neg mg/dL    Urobilinogen Urine 0.2 0.2 - 1.0 EU/dL    Nitrite Urine Neg NEG    Leukocytes neg     Wbc, Urine Micro 0-1 neg - 2    RBC Micro Urine 0-1 neg - 2    EP/HPF OCC     Bacteria Urine FEW (A) neg - neg    Casts/LPF neg     Miscellaneous neg    HEMOGRAM PLATELET DIFF ([facility])     Status: None   Result Value Ref Range    WBC 6.2 4.0 - 11 10*9/L    RBC Count 4.50 3.8 - 5.2 10*12/L    Hemoglobin 14.1 11.7 - 15.7 g/dL    Hematocrit 41.9 35.0 - 47.0 %    MCV 93.2 78 - 100 fL    MCH 31.3 26 - 33 pg    MCHC 33.7 31 - 36 g/dL    Platelet Count 169 150 - 375 10^9/L    % Granulocytes 54.0 %    % Lymphocytes 36.2 %    % Monocytes 9.8 %     1. Pelvic pain in female  The urine looks ok. Cbc ok. I will do a CT scan, possible colitis.  - URINALYSIS, ROUTINE ([facility])  - URINE CULTURE AEROBIC BACTERIAL ([facility])  - Radiology Referral; Future  - CT Abdomen Pelvis w Contrast; Future    2. Urinary urgency    - URINALYSIS, ROUTINE ([facility])  - URINE CULTURE AEROBIC BACTERIAL ([facility])  - HEMOGRAM PLATELET  "DIFF ([facility])  - VENOUS COLLECTION  - Radiology Referral; Future  - CT Abdomen Pelvis w Contrast; Future             FUTURE APPOINTMENTS:       - Follow-up visit per results.    No follow-ups on file.    Ayla Ellington MD  [facility]    Subjective     Nursing Notes:   Charlotte Garcia CMA  10/11/2021  1:32 PM  Signed  Chief Complaint   Patient presents with     Abdominal Pain     still having abdominal tenderness, still waking up 3x a night to urinate      Pre-visit Screening:  Immunizations:  up to date  Colonoscopy:  NA  Mammogram: NA  Asthma Action Test/Plan:  NA  PHQ9:  NA  GAD7:  NA  Questioned patient about current smoking habits Pt. quit smoking some time ago.  Ok to leave detailed message on voice mail for today's visit only Yes, phone # [phone]           Lynn Chun is a 36 year old female who presents to clinic today for the following health issues   HPI     Had increased bms, up at night urinating. This isn't normal for her. Intermittent fevers. Low grade. 2 days left of abx. No pain in abd now. But it gets crampy and uncomfortable.   Has her period now. No chance pregnancy,  with vasectomy. No recent travel.      Review of Systems   Constitutional, HEENT, cardiovascular, pulmonary, gi and gu systems are negative, except as otherwise noted.      Objective    /74 (BP Location: Right arm, Patient Position: Sitting, Cuff Size: Adult Regular)   Pulse 72   Temp 98  F (36.7  C) (Temporal)   Ht 1.708 m (5' 7.25\")   Wt 72.1 kg (159 lb)   LMP 10/03/2021   SpO2 98%   BMI 24.72 kg/m    Body mass index is 24.72 kg/m .  Physical Exam   GENERAL: healthy, alert and no distress  ABDOMEN: soft, nontender, no hepatosplenomegaly, no masses and bowel sounds normal  MS: no gross musculoskeletal defects noted, no edema  NEURO: Normal strength and tone, mentation intact and speech normal  PSYCH: mentation appears normal, affect normal/bright  Slight suprapubic tenderness to " palpation, no rebound no guarding.    Results for orders placed or performed in visit on 10/11/21   URINALYSIS, ROUTINE ([facility])     Status: Abnormal   Result Value Ref Range    Color Urine Yellow     Appearance Urine Clear     Glucose Urine Neg neg mg/dL    Bilirubin Urine Neg neg    Ketones Urine Neg neg mg/dL    Specific Gravity Urine 1.010 1.003 - 1.035    Blood Urine Trace (A) neg    pH Urine 6.0 5.0 - 7.0 pH    Protein Urine neg neg - neg mg/dL    Urobilinogen Urine 0.2 0.2 - 1.0 EU/dL    Nitrite Urine Neg NEG    Leukocytes neg     Wbc, Urine Micro 0-1 neg - 2    RBC Micro Urine 0-1 neg - 2    EP/HPF OCC     Bacteria Urine FEW (A) neg - neg    Casts/LPF neg     Miscellaneous neg    HEMOGRAM PLATELET DIFF ([facility])     Status: None   Result Value Ref Range    WBC 6.2 4.0 - 11 10*9/L    RBC Count 4.50 3.8 - 5.2 10*12/L    Hemoglobin 14.1 11.7 - 15.7 g/dL    Hematocrit 41.9 35.0 - 47.0 %    MCV 93.2 78 - 100 fL    MCH 31.3 26 - 33 pg    MCHC 33.7 31 - 36 g/dL    Platelet Count 169 150 - 375 10^9/L    % Granulocytes 54.0 %    % Lymphocytes 36.2 %    % Monocytes 9.8 %

## 2021-10-11 NOTE — PATIENT INSTRUCTIONS
Assessment & Plan   Problem List Items Addressed This Visit     None      Visit Diagnoses     Pelvic pain in female    -  Primary    Relevant Orders    URINALYSIS, ROUTINE ([facility]) (Completed)    URINE CULTURE AEROBIC BACTERIAL ([facility])    Radiology Referral    CT Abdomen Pelvis w Contrast    Urinary urgency        Relevant Orders    URINALYSIS, ROUTINE ([facility]) (Completed)    URINE CULTURE AEROBIC BACTERIAL ([facility])    HEMOGRAM PLATELET DIFF ([facility]) (Completed)    VENOUS COLLECTION (Completed)    Radiology Referral    CT Abdomen Pelvis w Contrast         Results for orders placed or performed in visit on 10/11/21   URINALYSIS, ROUTINE ([facility])     Status: Abnormal   Result Value Ref Range    Color Urine Yellow     Appearance Urine Clear     Glucose Urine Neg neg mg/dL    Bilirubin Urine Neg neg    Ketones Urine Neg neg mg/dL    Specific Gravity Urine 1.010 1.003 - 1.035    Blood Urine Trace (A) neg    pH Urine 6.0 5.0 - 7.0 pH    Protein Urine neg neg - neg mg/dL    Urobilinogen Urine 0.2 0.2 - 1.0 EU/dL    Nitrite Urine Neg NEG    Leukocytes neg     Wbc, Urine Micro 0-1 neg - 2    RBC Micro Urine 0-1 neg - 2    EP/HPF OCC     Bacteria Urine FEW (A) neg - neg    Casts/LPF neg     Miscellaneous neg    HEMOGRAM PLATELET DIFF ([facility])     Status: None   Result Value Ref Range    WBC 6.2 4.0 - 11 10*9/L    RBC Count 4.50 3.8 - 5.2 10*12/L    Hemoglobin 14.1 11.7 - 15.7 g/dL    Hematocrit 41.9 35.0 - 47.0 %    MCV 93.2 78 - 100 fL    MCH 31.3 26 - 33 pg    MCHC 33.7 31 - 36 g/dL    Platelet Count 169 150 - 375 10^9/L    % Granulocytes 54.0 %    % Lymphocytes 36.2 %    % Monocytes 9.8 %     1. Pelvic pain in female  The urine looks ok. Cbc ok. I will do a CT scan, possible colitis.  - URINALYSIS, ROUTINE ([facility])  - URINE CULTURE AEROBIC BACTERIAL ([facility])  - Radiology Referral; Future  - CT Abdomen Pelvis w Contrast; Future    2. Urinary urgency    - URINALYSIS, ROUTINE ([facility])  - URINE CULTURE AEROBIC BACTERIAL ([facility])  - HEMOGRAM PLATELET  DIFF ([facility])  - VENOUS COLLECTION  - Radiology Referral; Future  - CT Abdomen Pelvis w Contrast; Future             FUTURE APPOINTMENTS:       - Follow-up visit per results.    No follow-ups on file.    Ayla Ellington MD  [facility]

## 2021-10-11 NOTE — NURSING NOTE
Chief Complaint   Patient presents with     Abdominal Pain     still having abdominal tenderness, still waking up 3x a night to urinate      Pre-visit Screening:  Immunizations:  up to date  Colonoscopy:  NA  Mammogram: NA  Asthma Action Test/Plan:  NA  PHQ9:  NA  GAD7:  NA  Questioned patient about current smoking habits Pt. quit smoking some time ago.  Ok to leave detailed message on voice mail for today's visit only Yes, phone # [phone]

## 2021-10-13 LAB — URINE VOIDED CULTURE: NORMAL

## 2021-11-24 NOTE — TELEPHONE ENCOUNTER
Denied refill request for escitalopram. Pt is due for an anxiety med re-check. She should have enough pills until 12/1/21. I will send a FaceOn Mobile message to inform.

## 2021-12-08 ENCOUNTER — OFFICE VISIT (OUTPATIENT)
Dept: FAMILY MEDICINE | Facility: CLINIC | Age: 36
End: 2021-12-08

## 2021-12-08 DIAGNOSIS — F41.1 GENERALIZED ANXIETY DISORDER: Primary | ICD-10-CM

## 2021-12-08 PROCEDURE — 99212 OFFICE O/P EST SF 10 MIN: CPT | Mod: 95 | Performed by: PHYSICIAN ASSISTANT

## 2021-12-08 RX ORDER — ESCITALOPRAM OXALATE 10 MG/1
10 TABLET ORAL DAILY
Qty: 90 TABLET | Refills: 0 | Status: SHIPPED | OUTPATIENT
Start: 2021-12-08 | End: 2022-03-18

## 2021-12-08 NOTE — PROGRESS NOTES
This visit was completed via telemedicine using [employer] including audio and video. Patient consents to being seen virtually and understands this will be billed as an office visit.     Provider physical location: [facility]  Patient physical location: Pt's car    Patient and I completed history, ROS, and HPI via virtual encounter with video/sound.       There was no physical examination done due to telemedicine appointment.     CC: Medication Check    History:  Anxiety:  Had virtual visit with patient 10/1/2021 to discuss worsening anxiety. Had done trial of sertraline, but this caused diarrhea, so agreed to trial of escitalopram 5 mg daily. She feels that this has been working well to even out her symptoms, although does still having some break through anxiety. She is not interested in seeing therapy.     PMH, MEDICATIONS, ALLERGIES, SOCIAL AND FAMILY HISTORY in EPIC and reviewed by me personally.    ROS negative other than the symptoms noted above in the HPI.    Examination   There were no vitals taken for this visit.     Constitutional: Sitting comfortably, in no acute distress. Vital signs noted  Psychiatric: mentation appears normal and affect normal/bright      A/P    ICD-10-CM    1. Generalized anxiety disorder  F41.1 escitalopram (LEXAPRO) 10 MG tablet       DISCUSSION:  CODY:  Updated CODY/PHQ today and does show improvement. Given some break through symptoms, recommended trial of escitalopram 10 mg daily. Monitor for side effects, and contact me if noted. Otherwise, please return in 3 months to discuss, and could consider long term refills at that time. Okay to hold off on therapy at this time.     follow up visit: 3 months    Time of visit: 15 minutes    Maribell Simpson PA-C  [facility]

## 2021-12-08 NOTE — NURSING NOTE
Chief Complaint   Patient presents with     Recheck Medication     virtual med check today     Anxiety         Pre-visit Screening:  Immunizations:  up to date  Colonoscopy:  NA  Mammogram: NA  Asthma Action Test/Plan:  NA  PHQ9:  Done today via Glass  GAD7:  Done today via Glass  Questioned patient about current smoking habits Pt. has never smoked.  Ok to leave detailed message on voice mail for today's visit only Yes, phone # [phone]

## 2022-03-01 ENCOUNTER — TELEPHONE (OUTPATIENT)
Dept: FAMILY MEDICINE | Facility: CLINIC | Age: 37
End: 2022-03-01

## 2022-03-18 ENCOUNTER — OFFICE VISIT (OUTPATIENT)
Dept: FAMILY MEDICINE | Facility: CLINIC | Age: 37
End: 2022-03-18

## 2022-03-18 ENCOUNTER — MYC MEDICAL ADVICE (OUTPATIENT)
Dept: FAMILY MEDICINE | Facility: CLINIC | Age: 37
End: 2022-03-18

## 2022-03-18 DIAGNOSIS — F41.1 GENERALIZED ANXIETY DISORDER: ICD-10-CM

## 2022-03-18 PROCEDURE — 99213 OFFICE O/P EST LOW 20 MIN: CPT | Mod: 95 | Performed by: FAMILY MEDICINE

## 2022-03-18 RX ORDER — ESCITALOPRAM OXALATE 10 MG/1
10 TABLET ORAL DAILY
Qty: 90 TABLET | Refills: 1 | Status: SHIPPED | OUTPATIENT
Start: 2022-03-18 | End: 2023-02-14

## 2022-03-18 ASSESSMENT — PATIENT HEALTH QUESTIONNAIRE - PHQ9
SUM OF ALL RESPONSES TO PHQ QUESTIONS 1-9: 2
SUM OF ALL RESPONSES TO PHQ QUESTIONS 1-9: 2
10. IF YOU CHECKED OFF ANY PROBLEMS, HOW DIFFICULT HAVE THESE PROBLEMS MADE IT FOR YOU TO DO YOUR WORK, TAKE CARE OF THINGS AT HOME, OR GET ALONG WITH OTHER PEOPLE: NOT DIFFICULT AT ALL

## 2022-03-18 ASSESSMENT — ANXIETY QUESTIONNAIRES
6. BECOMING EASILY ANNOYED OR IRRITABLE: SEVERAL DAYS
7. FEELING AFRAID AS IF SOMETHING AWFUL MIGHT HAPPEN: NOT AT ALL
GAD7 TOTAL SCORE: 1
7. FEELING AFRAID AS IF SOMETHING AWFUL MIGHT HAPPEN: NOT AT ALL
GAD7 TOTAL SCORE: 1
3. WORRYING TOO MUCH ABOUT DIFFERENT THINGS: NOT AT ALL
1. FEELING NERVOUS, ANXIOUS, OR ON EDGE: NOT AT ALL
4. TROUBLE RELAXING: NOT AT ALL
GAD7 TOTAL SCORE: 1
2. NOT BEING ABLE TO STOP OR CONTROL WORRYING: NOT AT ALL
5. BEING SO RESTLESS THAT IT IS HARD TO SIT STILL: NOT AT ALL

## 2022-03-18 NOTE — PROGRESS NOTES
Assessment & Plan   Problem List Items Addressed This Visit     None      Visit Diagnoses     Generalized anxiety disorder        Relevant Medications    escitalopram (LEXAPRO) 10 MG tablet         Video-Visit Details    Type of service:  Video Visit    Video Start Time (time video started): 11:49    Video End Time (time video stopped): 11:52    Originating Location (pt. Location): Home    Distant Location (provider location):  [facility]     Mode of Communication:  Video Conference via [facility]    Physician has received verbal consent for a Video Visit from the patient? Yes      Ayla Ellington MD      1. Generalized anxiety disorder  Stable on current dose of lexapro, refilled.  - escitalopram (LEXAPRO) 10 MG tablet; Take 1 tablet (10 mg) by mouth daily  Dispense: 90 tablet; Refill: 1           FUTURE APPOINTMENTS:       - Follow-up visit in 6 months.    No follow-ups on file.    Ayla Ellington MD  [facility]    Subjective     There are no exam notes on file for this visit.     Lynn Chun is a 36 year old female who presents to clinic today for the following health issues   HPI     Anxiety Follow-Up    How are you doing with your anxiety since your last visit? No change    Are you having other symptoms that might be associated with anxiety? No    Have you had a significant life event? No     Are you feeling depressed? No    Do you have any concerns with your use of alcohol or other drugs? No    Social History     Tobacco Use     Smoking status: Never Smoker     Smokeless tobacco: Never Used   Substance Use Topics     Alcohol use: Yes     Alcohol/week: 1.0 standard drink     Types: 1 Standard drinks or equivalent per week     Drug use: No     CODY-7 SCORE 10/1/2021 12/8/2021 3/18/2022   Total Score 10 (moderate anxiety) 4 (minimal anxiety) 1 (minimal anxiety)   Total Score 10 4 1     PHQ 10/1/2021 12/8/2021 3/18/2022   PHQ-9 Total Score 3 2 2   Q9: Thoughts of better  off dead/self-harm past 2 weeks Not at all Not at all Not at all     Last PHQ-9 3/18/2022   1.  Little interest or pleasure in doing things 0   2.  Feeling down, depressed, or hopeless 0   3.  Trouble falling or staying asleep, or sleeping too much 1   4.  Feeling tired or having little energy 1   5.  Poor appetite or overeating 0   6.  Feeling bad about yourself 0   7.  Trouble concentrating 0   8.  Moving slowly or restless 0   Q9: Thoughts of better off dead/self-harm past 2 weeks 0   PHQ-9 Total Score 2   Difficulty at work, home, or with people -     CODY-7  3/18/2022   1. Feeling nervous, anxious, or on edge 0   2. Not being able to stop or control worrying 0   3. Worrying too much about different things 0   4. Trouble relaxing 0   5. Being so restless that it is hard to sit still 0   6. Becoming easily annoyed or irritable 1   7. Feeling afraid, as if something awful might happen 0   CODY-7 Total Score 1   If you checked any problems, how difficult have they made it for you to do your work, take care of things at home, or get along with other people? -       Trying to get on the right dose of anxiety medications. Has been on lexapro for 10 mg/day, for 3 months, this feels really good. No side effects.    Review of Systems   Constitutional, HEENT, cardiovascular, pulmonary, gi and gu systems are negative, except as otherwise noted.      Objective    LMP 03/18/2022 (Exact Date)   There is no height or weight on file to calculate BMI.  Physical Exam   GENERAL: healthy, alert and no distress  MS: no gross musculoskeletal defects noted, no edema  NEURO: Normal strength and tone, mentation intact and speech normal  PSYCH: mentation appears normal, affect normal/bright  Limited by video visit

## 2022-03-19 ASSESSMENT — ANXIETY QUESTIONNAIRES: GAD7 TOTAL SCORE: 1

## 2022-03-19 ASSESSMENT — PATIENT HEALTH QUESTIONNAIRE - PHQ9: SUM OF ALL RESPONSES TO PHQ QUESTIONS 1-9: 2

## 2022-09-17 ENCOUNTER — HEALTH MAINTENANCE LETTER (OUTPATIENT)
Age: 37
End: 2022-09-17

## 2022-09-18 DIAGNOSIS — F41.1 GENERALIZED ANXIETY DISORDER: ICD-10-CM

## 2022-09-19 RX ORDER — ESCITALOPRAM OXALATE 10 MG/1
10 TABLET ORAL DAILY
Qty: 90 TABLET | Refills: 1 | COMMUNITY
Start: 2022-09-19

## 2022-09-20 NOTE — TELEPHONE ENCOUNTER
Received incoming refill request for  Pending Prescriptions:                       Disp   Refills    escitalopram (LEXAPRO) 10 MG tablet [Phar*90 tab*1            Sig: TAKE 1 TABLET (10 MG) BY MOUTH DAILY.    The patient is now due for an OV and [employer] message was sent.

## 2023-02-14 DIAGNOSIS — F41.1 GENERALIZED ANXIETY DISORDER: ICD-10-CM

## 2023-02-14 RX ORDER — ESCITALOPRAM OXALATE 10 MG/1
10 TABLET ORAL DAILY
Qty: 15 TABLET | Refills: 0 | Status: SHIPPED | OUTPATIENT
Start: 2023-02-14 | End: 2023-03-08

## 2023-02-14 NOTE — TELEPHONE ENCOUNTER
Pt is requesting an extension of escitalopram. She takes half a tab of 10 mg (5 mg daily).  Has appt on 3/8/23.  Routing to Dr. Ellington.    Thanks, Meredith    Pending Prescriptions:                       Disp   Refills    escitalopram (LEXAPRO) 10 MG tablet       15 tab*0            Sig: Take 1 tablet (10 mg) by mouth daily

## 2023-02-16 DIAGNOSIS — F41.1 GENERALIZED ANXIETY DISORDER: ICD-10-CM

## 2023-02-16 RX ORDER — ESCITALOPRAM OXALATE 10 MG/1
10 TABLET ORAL DAILY
Qty: 90 TABLET | Refills: 1 | COMMUNITY
Start: 2023-02-16

## 2023-02-16 NOTE — TELEPHONE ENCOUNTER
Lynn Chun is requesting a refill of:    Refused Prescriptions:                       Disp   Refills    escitalopram (LEXAPRO) 10 MG tablet [Pharm*90 tab*1        Sig: TAKE 1 TABLET (10 MG) BY MOUTH DAILY.  Refused By: UBALDO ROJAS  Reason for Refusal: Should already have refills on file

## 2023-02-27 DIAGNOSIS — F41.1 GENERALIZED ANXIETY DISORDER: ICD-10-CM

## 2023-02-28 RX ORDER — ESCITALOPRAM OXALATE 10 MG/1
10 TABLET ORAL DAILY
Qty: 15 TABLET | Refills: 0 | COMMUNITY
Start: 2023-02-28

## 2023-02-28 NOTE — TELEPHONE ENCOUNTER
Lynn Chun is requesting a refill of:    Refused Prescriptions:                       Disp   Refills    escitalopram (LEXAPRO) 10 MG tablet [Pharm*15 tab*0        Sig: TAKE 1 TABLET (10 MG) BY MOUTH DAILY.  Refused By: BENJAMIN YOUNG  Reason for Refusal: Patient needs appointment    Pt has appt scheduled for 03/08/23 short supply already sent in

## 2023-03-08 ENCOUNTER — OFFICE VISIT (OUTPATIENT)
Dept: FAMILY MEDICINE | Facility: CLINIC | Age: 38
End: 2023-03-08

## 2023-03-08 VITALS
WEIGHT: 151 LBS | SYSTOLIC BLOOD PRESSURE: 106 MMHG | BODY MASS INDEX: 23.7 KG/M2 | HEART RATE: 82 BPM | HEIGHT: 67 IN | OXYGEN SATURATION: 98 % | TEMPERATURE: 98.2 F | DIASTOLIC BLOOD PRESSURE: 70 MMHG

## 2023-03-08 DIAGNOSIS — F41.1 GENERALIZED ANXIETY DISORDER: ICD-10-CM

## 2023-03-08 DIAGNOSIS — Z00.00 ENCOUNTER FOR ROUTINE HISTORY AND PHYSICAL EXAM IN FEMALE PATIENT: Primary | ICD-10-CM

## 2023-03-08 LAB
CHOLEST SERPL-MCNC: 214 MG/DL (ref 0–199)
CHOLEST/HDLC SERPL: 3 {RATIO} (ref 0–5)
GLUCOSE SERPL-MCNC: 87 MG/DL (ref 60–99)
HDLC SERPL-MCNC: 71 MG/DL (ref 40–150)
LDLC SERPL CALC-MCNC: 121 MG/DL (ref 0–130)
TRIGL SERPL-MCNC: 112 MG/DL (ref 0–149)

## 2023-03-08 PROCEDURE — 36415 COLL VENOUS BLD VENIPUNCTURE: CPT | Performed by: FAMILY MEDICINE

## 2023-03-08 PROCEDURE — 82947 ASSAY GLUCOSE BLOOD QUANT: CPT | Performed by: FAMILY MEDICINE

## 2023-03-08 PROCEDURE — 80061 LIPID PANEL: CPT | Performed by: FAMILY MEDICINE

## 2023-03-08 PROCEDURE — 99395 PREV VISIT EST AGE 18-39: CPT | Performed by: FAMILY MEDICINE

## 2023-03-08 RX ORDER — ESCITALOPRAM OXALATE 10 MG/1
10 TABLET ORAL DAILY
Qty: 90 TABLET | Refills: 3 | Status: SHIPPED | OUTPATIENT
Start: 2023-03-08 | End: 2024-07-03

## 2023-03-08 RX ORDER — ESCITALOPRAM OXALATE 10 MG/1
10 TABLET ORAL DAILY
Qty: 90 TABLET | Refills: 1 | Status: SHIPPED | OUTPATIENT
Start: 2023-03-08 | End: 2023-03-08

## 2023-03-08 ASSESSMENT — PATIENT HEALTH QUESTIONNAIRE - PHQ9
5. POOR APPETITE OR OVEREATING: NOT AT ALL
SUM OF ALL RESPONSES TO PHQ QUESTIONS 1-9: 1

## 2023-03-08 ASSESSMENT — ANXIETY QUESTIONNAIRES
1. FEELING NERVOUS, ANXIOUS, OR ON EDGE: SEVERAL DAYS
7. FEELING AFRAID AS IF SOMETHING AWFUL MIGHT HAPPEN: NOT AT ALL
2. NOT BEING ABLE TO STOP OR CONTROL WORRYING: NOT AT ALL
5. BEING SO RESTLESS THAT IT IS HARD TO SIT STILL: NOT AT ALL
3. WORRYING TOO MUCH ABOUT DIFFERENT THINGS: NOT AT ALL
GAD7 TOTAL SCORE: 2
IF YOU CHECKED OFF ANY PROBLEMS ON THIS QUESTIONNAIRE, HOW DIFFICULT HAVE THESE PROBLEMS MADE IT FOR YOU TO DO YOUR WORK, TAKE CARE OF THINGS AT HOME, OR GET ALONG WITH OTHER PEOPLE: NOT DIFFICULT AT ALL
GAD7 TOTAL SCORE: 2
6. BECOMING EASILY ANNOYED OR IRRITABLE: SEVERAL DAYS

## 2023-03-08 NOTE — PROGRESS NOTES
SUBJECTIVE:   CC: Homa is an 37 year old who presents for preventive health visit.   Patient has been advised of split billing requirements and indicates understanding: Yes  HPI    Here for a physical. She is doing well. Would like a refill on her lexapro, this is working well for her, controlling her anxiety.        Today's PHQ-2 Score:   PHQ-2 ( 1999 Pf[employer]) 3/8/2023   Q1: Little interest or pleasure in doing things 0   Q2: Feeling down, depressed or hopeless 0   PHQ-2 Score 0   PHQ-2 Total Score (12-17 Years)- Positive if 3 or more points; Administer PHQ-A if positive -           Social History     Tobacco Use     Smoking status: Never     Smokeless tobacco: Never   Substance Use Topics     Alcohol use: Yes     Alcohol/week: 1.0 standard drink     Types: 1 Standard drinks or equivalent per week     Comment: socially         No flowsheet data found.no concerns    Reviewed orders with patient.  Reviewed health maintenance and updated orders accordingly - Yes  BP Readings from Last 3 Encounters:   03/08/23 106/70   10/11/21 104/74   10/06/21 108/74    Wt Readings from Last 3 Encounters:   03/08/23 68.5 kg (151 lb)   10/11/21 72.1 kg (159 lb)   10/06/21 72.1 kg (159 lb)                  Patient Active Problem List   Diagnosis     Health Care Home     ACP (advance care planning)     Family history of thyroid disease in father     Ulnar neuropathy     CTS (carpal tunnel syndrome)     Cramps, extremity     Past Surgical History:   Procedure Laterality Date     EXTRACTION(S) DENTAL  age 25     PE TUBES  age 7     TONSILLECTOMY  age 7       Social History     Tobacco Use     Smoking status: Never     Smokeless tobacco: Never   Substance Use Topics     Alcohol use: Yes     Alcohol/week: 1.0 standard drink     Types: 1 Standard drinks or equivalent per week     Comment: socially     Family History   Problem Relation Age of Onset     Hyperlipidemia Mother      Connective Tissue Disorder Mother         raynaud's      Hypothyroidism Father 55        Graves     Coronary Artery Disease Father 58        stent     Hyperlipidemia Maternal Grandmother      Lymphoma Paternal Grandmother      Connective Tissue Disorder Sister         raynaud's     Cancer Paternal Grandfather         unknown     Breast Cancer No family hx of      Colon Cancer No family hx of          Current Outpatient Medications   Medication Sig Dispense Refill     escitalopram (LEXAPRO) 10 MG tablet Take 1 tablet (10 mg) by mouth daily 90 tablet 3     Multiple Vitamins-Minerals (MULTIVITAMIN ADULT) TABS Take 1 tablet by mouth daily          Breast Cancer Screening:  Any new diagnosis of family breast, ovarian, or bowel cancer? No    FHS-7: No flowsheet data found.      Pertinent mammograms are reviewed under the imaging tab.    History of abnormal Pap smear: history normal pap.   vasectomy     Reviewed and updated as needed this visit by clinical staff   Tobacco  Allergies   Problems             Reviewed and updated as needed this visit by Provider                 No past medical history on file.   Past Surgical History:   Procedure Laterality Date     EXTRACTION(S) DENTAL  age 25     PE TUBES  age 7     TONSILLECTOMY  age 7       Review of Systems  CONSTITUTIONAL: NEGATIVE for fever, chills, change in weight  INTEGUMENTARU/SKIN: NEGATIVE for worrisome rashes, moles or lesions  EYES: NEGATIVE for vision changes or irritation  ENT: NEGATIVE for ear, mouth and throat problems  RESP: NEGATIVE for significant cough or SOB  BREAST: NEGATIVE for masses, tenderness or discharge  CV: NEGATIVE for chest pain, palpitations or peripheral edema  GI: NEGATIVE for nausea, abdominal pain, heartburn, or change in bowel habits  : NEGATIVE for unusual urinary or vaginal symptoms. Periods are regular.  MUSCULOSKELETAL: NEGATIVE for significant arthralgias or myalgia  NEURO: NEGATIVE for weakness, dizziness or paresthesias  PSYCHIATRIC: NEGATIVE for changes in mood or affect    "  OBJECTIVE:   /70 (BP Location: Right arm, Patient Position: Sitting, Cuff Size: Adult Regular)   Pulse 82   Temp 98.2  F (36.8  C) (Temporal)   Ht 1.708 m (5' 7.25\")   Wt 68.5 kg (151 lb)   LMP 02/17/2023   SpO2 98%   BMI 23.47 kg/m    Physical Exam  GENERAL: healthy, alert and no distress  EYES: Eyes grossly normal to inspection, PERRL and conjunctivae and sclerae normal  HENT: ear canals and TM's normal, nose and mouth without ulcers or lesions  NECK: no adenopathy, no asymmetry, masses, or scars and thyroid normal to palpation  RESP: lungs clear to auscultation - no rales, rhonchi or wheezes  BREAST: normal without masses, tenderness or nipple discharge and no palpable axillary masses or adenopathy  CV: regular rate and rhythm, normal S1 S2, no S3 or S4, no murmur, click or rub, no peripheral edema and peripheral pulses strong  ABDOMEN: soft, nontender, no hepatosplenomegaly, no masses and bowel sounds normal   (female): normal female external genitalia, normal urethral meatus, vaginal mucosa pink, moist, well rugated, and normal cervix/adnexa/uterus without masses or discharge  MS: no gross musculoskeletal defects noted, no edema  SKIN: no suspicious lesions or rashes  NEURO: Normal strength and tone, mentation intact and speech normal  PSYCH: mentation appears normal, affect normal/bright    Diagnostic Test Results:  Labs reviewed in Epic  No results found for any visits on 03/08/23.    ASSESSMENT/PLAN:   1. Encounter for routine history and physical exam in female patient    - VENOUS COLLECTION  - Lipid Panel ([facility])  - Glucose Fasting ([facility])    2. Generalized anxiety disorder  Symptoms are controlled, refilled.  - escitalopram (LEXAPRO) 10 MG tablet; Take 1 tablet (10 mg) by mouth daily  Dispense: 90 tablet; Refill: 3    Patient has been advised of split billing requirements and indicates understanding: Yes      COUNSELING:  Reviewed preventive health counseling, as reflected in patient " instructions       Regular exercise       Healthy diet/nutrition        She reports that she has never smoked. She has never used smokeless tobacco.      Ayla Ellington MD  [facility]

## 2023-03-08 NOTE — NURSING NOTE
Chief Complaint   Patient presents with     Physical     Fasting today      Recheck Medication     Refill medications     Pre-visit Screening:  Immunizations:  up to date  Colonoscopy:  NA  Mammogram: NA  Asthma Action Test/Plan:  NA  PHQ9:  Done today  GAD7:  Done today  Questioned patient about current smoking habits Pt. has never smoked.  Ok to leave detailed message on voice mail for today's visit only Yes, phone # [phone]

## 2023-08-28 ENCOUNTER — APPOINTMENT (OUTPATIENT)
Dept: URBAN - METROPOLITAN AREA CLINIC 253 | Age: 38
Setting detail: DERMATOLOGY
End: 2023-08-29

## 2023-08-28 VITALS — HEIGHT: 67 IN | RESPIRATION RATE: 14 BRPM | WEIGHT: 150 LBS

## 2023-08-28 DIAGNOSIS — D22 MELANOCYTIC NEVI: ICD-10-CM

## 2023-08-28 DIAGNOSIS — L81.2 FRECKLES: ICD-10-CM

## 2023-08-28 DIAGNOSIS — L82.1 OTHER SEBORRHEIC KERATOSIS: ICD-10-CM

## 2023-08-28 DIAGNOSIS — Z71.89 OTHER SPECIFIED COUNSELING: ICD-10-CM

## 2023-08-28 PROBLEM — D23.62 OTHER BENIGN NEOPLASM OF SKIN OF LEFT UPPER LIMB, INCLUDING SHOULDER: Status: ACTIVE | Noted: 2023-08-28

## 2023-08-28 PROBLEM — D22.4 MELANOCYTIC NEVI OF SCALP AND NECK: Status: ACTIVE | Noted: 2023-08-28

## 2023-08-28 PROBLEM — D22.71 MELANOCYTIC NEVI OF RIGHT LOWER LIMB, INCLUDING HIP: Status: ACTIVE | Noted: 2023-08-28

## 2023-08-28 PROBLEM — D22.5 MELANOCYTIC NEVI OF TRUNK: Status: ACTIVE | Noted: 2023-08-28

## 2023-08-28 PROCEDURE — OTHER COUNSELING: OTHER

## 2023-08-28 PROCEDURE — 99213 OFFICE O/P EST LOW 20 MIN: CPT

## 2023-08-28 ASSESSMENT — LOCATION DETAILED DESCRIPTION DERM
LOCATION DETAILED: MID TRAPEZIAL NECK
LOCATION DETAILED: LEFT PROXIMAL PRETIBIAL REGION
LOCATION DETAILED: RIGHT ANTERIOR DISTAL THIGH
LOCATION DETAILED: RIGHT INFERIOR POSTERIOR NECK
LOCATION DETAILED: RIGHT PROXIMAL PRETIBIAL REGION
LOCATION DETAILED: SUPERIOR THORACIC SPINE
LOCATION DETAILED: LEFT SUPERIOR UPPER BACK

## 2023-08-28 ASSESSMENT — LOCATION SIMPLE DESCRIPTION DERM
LOCATION SIMPLE: UPPER BACK
LOCATION SIMPLE: RIGHT THIGH
LOCATION SIMPLE: POSTERIOR NECK
LOCATION SIMPLE: LEFT PRETIBIAL REGION
LOCATION SIMPLE: LEFT UPPER BACK
LOCATION SIMPLE: TRAPEZIAL NECK
LOCATION SIMPLE: RIGHT PRETIBIAL REGION

## 2023-08-28 ASSESSMENT — LOCATION ZONE DERM
LOCATION ZONE: NECK
LOCATION ZONE: TRUNK
LOCATION ZONE: LEG

## 2024-05-04 ENCOUNTER — HEALTH MAINTENANCE LETTER (OUTPATIENT)
Age: 39
End: 2024-05-04

## 2024-06-17 PROBLEM — Z76.89 HEALTH CARE HOME: Status: RESOLVED | Noted: 2017-12-01 | Resolved: 2024-06-17

## 2024-06-21 NOTE — PROGRESS NOTES
Preventive Care Visit  [facility]  Ayla Ellington MD, Family Medicine  Jul 3, 2024       SUBJECTIVE:   Homa is a 39 year old, presenting for the following:  Physical (Fasting today) and Urinary Problem (1 month ago she had urinary urgency that resolved, last week she developed urinary urgency again she went to  and was given macrobid, she thinks her UTI may be back )      HPI      Here for a physical.  Due for a pap.  Also thinks she might have a uti.    She was previously on escitalopram for anxiety/depression, has stopped this and is feeling well off of it.    Also feels like she might have a uti, recently treated with macrobid a couple of weeks ago, then sx resolved. But then again last night had pressure and urinary frequency. Wondering if she again has uti. No vaginal concerns or sx.          Social History     Tobacco Use    Smoking status: Never     Passive exposure: Never    Smokeless tobacco: Never   Substance Use Topics    Alcohol use: Yes     Alcohol/week: 1.0 standard drink of alcohol     Types: 1 Standard drinks or equivalent per week     Comment: socially              No data to display            No concers  Reviewed orders with patient.  Reviewed health maintenance and updated orders accordingly - Yes  BP Readings from Last 3 Encounters:   07/03/24 116/72   03/08/23 106/70   10/11/21 104/74    Wt Readings from Last 3 Encounters:   07/03/24 67.6 kg (149 lb)   03/08/23 68.5 kg (151 lb)   10/11/21 72.1 kg (159 lb)                  Patient Active Problem List   Diagnosis    ACP (advance care planning)    Family history of thyroid disease in father    Ulnar neuropathy    CTS (carpal tunnel syndrome)    Cramps, extremity     Past Surgical History:   Procedure Laterality Date    EXTRACTION(S) DENTAL  age 25    PE TUBES  age 7    TONSILLECTOMY  age 7       Social History     Tobacco Use    Smoking status: Never     Passive exposure: Never    Smokeless tobacco: Never   Substance  "Use Topics    Alcohol use: Yes     Alcohol/week: 1.0 standard drink of alcohol     Types: 1 Standard drinks or equivalent per week     Comment: socially     Family History   Problem Relation Age of Onset    Hyperlipidemia Mother     Connective Tissue Disorder Mother         raynaud's    Hypothyroidism Father 55        Graves    Coronary Artery Disease Father 58        stent    Hyperlipidemia Maternal Grandmother     Lymphoma Paternal Grandmother     Connective Tissue Disorder Sister         raynaud's    Cancer Paternal Grandfather         unknown    Breast Cancer No family hx of     Colon Cancer No family hx of          Current Outpatient Medications   Medication Sig Dispense Refill    ciprofloxacin (CIPRO) 500 MG tablet Take 1 tablet (500 mg) by mouth 2 times daily for 5 days 10 tablet 0    Multiple Vitamins-Minerals (MULTIVITAMIN ADULT) TABS Take 1 tablet by mouth daily          Breast Cancer Screening:    FHS-7:        No data to display              Self breast exams    Pertinent mammograms are reviewed under the imaging tab.      History of abnormal Pap smear: due now for pap            Reviewed and updated as needed this visit by clinical staff   Tobacco  Allergies  Meds  Problems             Reviewed and updated as needed this visit by Provider                  No past medical history on file.   Past Surgical History:   Procedure Laterality Date    EXTRACTION(S) DENTAL  age 25    PE TUBES  age 7    TONSILLECTOMY  age 7     Review of Systems    Review of Systems  Constitutional, HEENT, cardiovascular, pulmonary, gi and gu systems are negative, except as otherwise noted.    OBJECTIVE:   /72 (BP Location: Right arm, Patient Position: Sitting, Cuff Size: Adult Regular)   Pulse 93   Temp 98.3  F (36.8  C) (Temporal)   Ht 1.702 m (5' 7\")   Wt 67.6 kg (149 lb)   LMP 06/10/2024   SpO2 97%   BMI 23.34 kg/m     Estimated body mass index is 23.34 kg/m  as calculated from the following:    Height as of " "this encounter: 1.702 m (5' 7\").    Weight as of this encounter: 67.6 kg (149 lb).  Physical Exam  GENERAL: alert and no distress  EYES: Eyes grossly normal to inspection, PERRL and conjunctivae and sclerae normal  HENT: ear canals and TM's normal, nose and mouth without ulcers or lesions  NECK: no adenopathy, no asymmetry, masses, or scars  RESP: lungs clear to auscultation - no rales, rhonchi or wheezes  BREAST: normal without masses, tenderness or nipple discharge and no palpable axillary masses or adenopathy  CV: regular rate and rhythm, normal S1 S2, no S3 or S4, no murmur, click or rub, no peripheral edema  ABDOMEN: soft, nontender, no hepatosplenomegaly, no masses and bowel sounds normal   (female) w/bimanual: normal female external genitalia, normal urethral meatus, normal vaginal mucosa, and normal cervix/adnexa/uterus without masses or discharge  MS: no gross musculoskeletal defects noted, no edema  SKIN: no suspicious lesions or rashes  NEURO: Normal strength and tone, mentation intact and speech normal  PSYCH: mentation appears normal, affect normal/bright    Diagnostic Test Results:  Labs reviewed in Epic  Results for orders placed or performed in visit on 07/03/24   URINALYSIS, ROUTINE ([facility])     Status: Abnormal   Result Value Ref Range    Color Urine Yellow     Appearance Urine Cloudy (A)     Glucose Urine Neg mg/dL    Bilirubin Urine Neg     Ketones Urine Neg mg/dL    Specific Gravity Urine 1.020     Blood Urine Trace (A)     pH Urine 6.5 pH    Protein Urine neg neg - neg mg/dL    Urobilinogen Urine 0.2 EU/dL    Nitrite Urine Neg     Leukocytes SMALL (A)     Wbc, Urine Micro 3-6 (A) neg - 2    RBC Micro Urine 1-3 (A) neg - 2    EP/HPF MOD     Bacteria Urine MOD (A) neg - neg    Casts/LPF neg     Miscellaneous MOD mucus strands (A)        ASSESSMENT/PLAN:   1. Encounter for routine history and physical exam in female patient    - TDAP VACCINE (Adacel, Boostrix)  [[id number]]  - VENOUS COLLECTION  - " Basic Metabolic Panel ([facility])  - Lipid Panel ([facility])    2. Urinary urgency    - URINALYSIS, ROUTINE ([facility])  - ciprofloxacin (CIPRO) 500 MG tablet; Take 1 tablet (500 mg) by mouth 2 times daily for 5 days  Dispense: 10 tablet; Refill: 0    3. Screening for cervical cancer    - IA PELVIC EXAMINATION  - THINPREP TIS PAP AND HPV mRNA E6/E7 ([facility])    4. Acute cystitis without hematuria  Infection, treat with antibiotics. Culture pending.  - URINE CULTURE AEROBIC BACTERIAL ([facility])     Patient has been advised of split billing requirements and indicates understanding: Yes      Counseling  Reviewed preventive health counseling, as reflected in patient instructions       Regular exercise       Healthy diet/nutrition        She reports that she has never smoked. She has never been exposed to tobacco smoke. She has never used smokeless tobacco.          Signed Electronically by: Ayla Ellington MD

## 2024-07-03 ENCOUNTER — OFFICE VISIT (OUTPATIENT)
Dept: FAMILY MEDICINE | Facility: CLINIC | Age: 39
End: 2024-07-03

## 2024-07-03 VITALS
HEART RATE: 93 BPM | DIASTOLIC BLOOD PRESSURE: 72 MMHG | OXYGEN SATURATION: 97 % | WEIGHT: 149 LBS | TEMPERATURE: 98.3 F | BODY MASS INDEX: 23.39 KG/M2 | SYSTOLIC BLOOD PRESSURE: 116 MMHG | HEIGHT: 67 IN

## 2024-07-03 DIAGNOSIS — Z12.4 SCREENING FOR CERVICAL CANCER: ICD-10-CM

## 2024-07-03 DIAGNOSIS — R39.15 URINARY URGENCY: ICD-10-CM

## 2024-07-03 DIAGNOSIS — N30.00 ACUTE CYSTITIS WITHOUT HEMATURIA: ICD-10-CM

## 2024-07-03 DIAGNOSIS — Z00.00 ENCOUNTER FOR ROUTINE HISTORY AND PHYSICAL EXAM IN FEMALE PATIENT: Primary | ICD-10-CM

## 2024-07-03 LAB
APPEARANCE UR: ABNORMAL
BACTERIA, UR: ABNORMAL
BILIRUB UR QL: ABNORMAL
BUN SERPL-MCNC: 11 MG/DL (ref 7–25)
BUN/CREATININE RATIO: 13 (ref 6–32)
CALCIUM SERPL-MCNC: 8.9 MG/DL (ref 8.6–10.3)
CASTS/LPF: ABNORMAL
CHLORIDE SERPLBLD-SCNC: 102.4 MMOL/L (ref 98–110)
CHOLEST SERPL-MCNC: 200 MG/DL (ref 0–199)
CHOLEST/HDLC SERPL: 3 {RATIO} (ref 0–5)
CO2 SERPL-SCNC: 27.5 MMOL/L (ref 20–32)
COLOR UR: YELLOW
CREAT SERPL-MCNC: 0.87 MG/DL (ref 0.6–1.3)
EP/HPF: ABNORMAL
GLUCOSE SERPL-MCNC: 93 MG/DL (ref 60–99)
GLUCOSE URINE: ABNORMAL MG/DL
HDLC SERPL-MCNC: 75 MG/DL (ref 40–150)
HGB UR QL: ABNORMAL
KETONES UR QL: ABNORMAL MG/DL
LDLC SERPL CALC-MCNC: 107 MG/DL
MISC.: ABNORMAL
NITRITE UR QL STRIP: ABNORMAL
PH UR STRIP: 6.5 PH (ref 5–7)
POTASSIUM SERPL-SCNC: 4.18 MMOL/L (ref 3.5–5.3)
PROT UR QL: ABNORMAL MG/DL
RBC, UR MICRO: ABNORMAL (ref ?–2)
SODIUM SERPL-SCNC: 137.6 MMOL/L (ref 135–146)
SP GR UR STRIP: 1.02
TRIGL SERPL-MCNC: 91 MG/DL (ref 0–149)
UROBILINOGEN UR QL STRIP: 0.2 EU/DL (ref 0.2–1)
WBC #/AREA URNS HPF: ABNORMAL /[HPF]
WBC, UR MICRO: ABNORMAL (ref ?–2)

## 2024-07-03 PROCEDURE — 90715 TDAP VACCINE 7 YRS/> IM: CPT | Performed by: FAMILY MEDICINE

## 2024-07-03 PROCEDURE — 90471 IMMUNIZATION ADMIN: CPT | Performed by: FAMILY MEDICINE

## 2024-07-03 PROCEDURE — 80048 BASIC METABOLIC PNL TOTAL CA: CPT | Performed by: FAMILY MEDICINE

## 2024-07-03 PROCEDURE — 80061 LIPID PANEL: CPT | Performed by: FAMILY MEDICINE

## 2024-07-03 PROCEDURE — 99459 PELVIC EXAMINATION: CPT | Performed by: FAMILY MEDICINE

## 2024-07-03 PROCEDURE — 36415 COLL VENOUS BLD VENIPUNCTURE: CPT | Performed by: FAMILY MEDICINE

## 2024-07-03 PROCEDURE — 81001 URINALYSIS AUTO W/SCOPE: CPT | Performed by: FAMILY MEDICINE

## 2024-07-03 PROCEDURE — 99395 PREV VISIT EST AGE 18-39: CPT | Mod: 25 | Performed by: FAMILY MEDICINE

## 2024-07-03 PROCEDURE — 99213 OFFICE O/P EST LOW 20 MIN: CPT | Mod: 25 | Performed by: FAMILY MEDICINE

## 2024-07-03 RX ORDER — CIPROFLOXACIN 500 MG/1
500 TABLET, FILM COATED ORAL 2 TIMES DAILY
Qty: 10 TABLET | Refills: 0 | Status: SHIPPED | OUTPATIENT
Start: 2024-07-03 | End: 2024-07-08

## 2024-07-03 NOTE — NURSING NOTE
Chief Complaint   Patient presents with    Physical     Fasting today    Urinary Problem     1 month ago she had urinary urgency that resolved, last week she developed urinary urgency again she went to  and was given macrobid, she thinks her UTI may be back      Pre-visit Screening:  Immunizations:  not up to date - tdap today  Colonoscopy:  NA  Mammogram: NA  Asthma Action Test/Plan:  NA  PHQ9:  NA  GAD7:  NA  Questioned patient about current smoking habits Pt. has never smoked.  Ok to leave detailed message on voice mail for today's visit only Yes, phone # [phone]

## 2024-07-05 ENCOUNTER — OFFICE VISIT (OUTPATIENT)
Dept: URGENT CARE | Facility: URGENT CARE | Age: 39
End: 2024-07-05
Payer: COMMERCIAL

## 2024-07-05 VITALS
WEIGHT: 152 LBS | TEMPERATURE: 98.5 F | OXYGEN SATURATION: 99 % | SYSTOLIC BLOOD PRESSURE: 132 MMHG | BODY MASS INDEX: 23.81 KG/M2 | DIASTOLIC BLOOD PRESSURE: 87 MMHG | HEART RATE: 92 BPM

## 2024-07-05 DIAGNOSIS — R31.9 HEMATURIA, UNSPECIFIED TYPE: ICD-10-CM

## 2024-07-05 DIAGNOSIS — Z87.440 PERSONAL HISTORY OF URINARY TRACT INFECTION: ICD-10-CM

## 2024-07-05 DIAGNOSIS — N76.0 BV (BACTERIAL VAGINOSIS): ICD-10-CM

## 2024-07-05 DIAGNOSIS — R35.0 URINARY FREQUENCY: Primary | ICD-10-CM

## 2024-07-05 DIAGNOSIS — B96.89 BV (BACTERIAL VAGINOSIS): ICD-10-CM

## 2024-07-05 LAB
ALBUMIN UR-MCNC: NEGATIVE MG/DL
APPEARANCE UR: CLEAR
BACTERIA #/AREA URNS HPF: ABNORMAL /HPF
BILIRUB UR QL STRIP: NEGATIVE
CLUE CELLS: PRESENT
COLOR UR AUTO: YELLOW
GLUCOSE UR STRIP-MCNC: NEGATIVE MG/DL
HCG UR QL: NEGATIVE
HGB UR QL STRIP: ABNORMAL
KETONES UR STRIP-MCNC: ABNORMAL MG/DL
LEUKOCYTE ESTERASE UR QL STRIP: ABNORMAL
NITRATE UR QL: NEGATIVE
PH UR STRIP: 6 [PH] (ref 5–7)
RBC #/AREA URNS AUTO: ABNORMAL /HPF
SP GR UR STRIP: 1.01 (ref 1–1.03)
SQUAMOUS #/AREA URNS AUTO: ABNORMAL /LPF
TRICHOMONAS, WET PREP: ABNORMAL
URINE VOIDED CULTURE: NORMAL
UROBILINOGEN UR STRIP-ACNC: 0.2 E.U./DL
WBC #/AREA URNS AUTO: ABNORMAL /HPF
WBC'S/HIGH POWER FIELD, WET PREP: ABNORMAL
YEAST, WET PREP: ABNORMAL

## 2024-07-05 PROCEDURE — 87086 URINE CULTURE/COLONY COUNT: CPT | Performed by: PHYSICIAN ASSISTANT

## 2024-07-05 PROCEDURE — 81025 URINE PREGNANCY TEST: CPT | Performed by: PHYSICIAN ASSISTANT

## 2024-07-05 PROCEDURE — 87210 SMEAR WET MOUNT SALINE/INK: CPT | Performed by: PHYSICIAN ASSISTANT

## 2024-07-05 PROCEDURE — 87491 CHLMYD TRACH DNA AMP PROBE: CPT | Performed by: PHYSICIAN ASSISTANT

## 2024-07-05 PROCEDURE — 81001 URINALYSIS AUTO W/SCOPE: CPT | Performed by: PHYSICIAN ASSISTANT

## 2024-07-05 PROCEDURE — 87591 N.GONORRHOEAE DNA AMP PROB: CPT | Performed by: PHYSICIAN ASSISTANT

## 2024-07-05 PROCEDURE — 99214 OFFICE O/P EST MOD 30 MIN: CPT | Performed by: PHYSICIAN ASSISTANT

## 2024-07-05 RX ORDER — METRONIDAZOLE 500 MG/1
500 TABLET ORAL 2 TIMES DAILY
Qty: 14 TABLET | Refills: 0 | Status: SHIPPED | OUTPATIENT
Start: 2024-07-05 | End: 2024-07-12

## 2024-07-05 NOTE — PROGRESS NOTES
Assessment & Plan     Urinary frequency    Patient has been treated twice for UTI without it clearing  Urine culture neg  Urine HCG neg    STD pending  Urine culture pending    - HCG Qual, Urine ([id number])  - Wet prep - lab collect  - NEISSERIA GONORRHOEA PCR  - CHLAMYDIA TRACHOMATIS PCR  - Wet prep - lab collect  - UA Microscopic with Reflex to Culture  - Urine Culture    Personal history of urinary tract infection    This does not appear to be a UTI  Urine culture pending  STD pending    - UA with Microscopic reflex to Culture  - Wet prep - lab collect  - NEISSERIA GONORRHOEA PCR  - CHLAMYDIA TRACHOMATIS PCR  - Wet prep - lab collect  - UA Microscopic with Reflex to Culture    BV (bacterial vaginosis)    You have a vaginal infection called bacterial vaginosis (BV). BV occurs when the good bacteria are outnumbered by the bad bacteria causes an imbalance in the vagina. BV is linked with sexual activity, but it's not a sexually transmitted infection (STI).   BV may or may not cause symptoms of thin, gray, milky-white, or sometimes green discharge, unpleasant odor or  fishy  smell.  BV is most often treated with medicines called antibiotics. These may be given as pills or as a vaginal cream.  Do not drink alcohol while on Flagyl or Metrogel as this can cause severe nausea and vomiting.    It's not known what causes BV, but certain factors can make the problem more likely. These can include:   Douching  Spermicides  Use of antibiotics  Change in hormone levels with pregnancy, breastfeeding, or menopause  Having sex with a new partner  Having sex with more than one partner      - metroNIDAZOLE (FLAGYL) 500 MG tablet  Dispense: 14 tablet; Refill: 0    Hematuria, unspecified type    Due to having blood in UA for the last 5 yrs  Referred to urology  - Adult Urology [facility] Referral       Referral to urology for hematuria    No follow-ups on file.    Jv Gar, [facility], PA-C  [facility]  ROBERTO Luther is a 39 year old female who presents to clinic today for the following health issues:  Chief Complaint   Patient presents with    Urgent Care     Urinary frequency, urgency, full bladder - seen on 7/3 by PCP - was prescribed Cipro- her urinalysis showed infection but the culture came back negative for everything - recurrent sx this 2nd round abx - currently still on cipro        HPI  Review of Systems  Constitutional, HEENT, cardiovascular, pulmonary, GI, , musculoskeletal, neuro, skin, endocrine and psych systems are negative, except as otherwise noted.      Objective    /87   Pulse 92   Temp 98.5  F (36.9  C) (Tympanic)   Wt 68.9 kg (152 lb)   LMP 06/10/2024   SpO2 99%   BMI 23.81 kg/m    Physical Exam   GENERAL: alert and no distress  RESP: lungs clear to auscultation - no rales, rhonchi or wheezes  CV: regular rate and rhythm, normal S1 S2, no S3 or S4, no murmur, click or rub, no peripheral edema  ABDOMEN: soft, nontender, no hepatosplenomegaly, no masses and bowel sounds normal   (female): deferred  MS: no gross musculoskeletal defects noted, no edema  SKIN: no suspicious lesions or rashes  NEURO: Normal strength and tone, mentation intact and speech normal  PSYCH: mentation appears normal, affect normal/bright      Results for orders placed or performed in visit on 07/05/24   HCG Qual, Urine ([id number])     Status: Normal   Result Value Ref Range    hCG Urine Qualitative Negative Negative   UA with Microscopic reflex to Culture     Status: Abnormal    Specimen: Urine, Clean Catch   Result Value Ref Range    Color Urine Yellow Colorless, Straw, Light Yellow, Yellow    Appearance Urine Clear Clear    Glucose Urine Negative Negative mg/dL    Bilirubin Urine Negative Negative    Ketones Urine Trace (A) Negative mg/dL    Specific Gravity Urine 1.010 1.003 - 1.035    Blood Urine Trace (A) Negative    pH Urine 6.0 5.0 - 7.0    Protein Albumin Urine Negative Negative mg/dL     Urobilinogen Urine 0.2 0.2, 1.0 E.U./dL    Nitrite Urine Negative Negative    Leukocyte Esterase Urine Trace (A) Negative   UA Microscopic with Reflex to Culture     Status: Abnormal   Result Value Ref Range    Bacteria Urine Few (A) None Seen /HPF    RBC Urine 0-2 0-2 /HPF /HPF    WBC Urine 0-5 0-5 /HPF /HPF    Squamous Epithelials Urine Few (A) None Seen /LPF    Narrative    Urine Culture not indicated   Wet prep - lab collect     Status: Abnormal    Specimen: Vagina; Swab   Result Value Ref Range    Trichomonas Absent Absent    Yeast Absent Absent    Clue Cells Present (A) Absent    WBCs/high power field 2+ (A) None

## 2024-07-07 LAB
BACTERIA UR CULT: NO GROWTH
C TRACH DNA SPEC QL NAA+PROBE: NEGATIVE
N GONORRHOEA DNA SPEC QL NAA+PROBE: NEGATIVE

## 2024-07-08 LAB
CLINICAL HISTORY - QUEST: NORMAL
COMMENT - QUEST: NORMAL
COMMENT - QUEST: NORMAL
CYTOTECHNOLOGIST - QUEST: NORMAL
DESCRIPTIVE DIAGNOSIS - QUEST: NORMAL
HPV MRNA E6/E7: NOT DETECTED
LAST PAP DX - QUEST: NORMAL
LMP - QUEST: NORMAL
PREV BX DX - QUEST: NORMAL
SOURCE: NORMAL
STATEMENT OF ADEQUACY - QUEST: NORMAL

## 2024-07-10 NOTE — PROGRESS NOTES
Assessment & Plan   Problem List Items Addressed This Visit    None  Visit Diagnoses       Pelvic pain in female    -  Primary    Relevant Medications    ciprofloxacin (CIPRO) 500 MG tablet    Other Relevant Orders    URINALYSIS, ROUTINE ([facility]) (Completed)    Adult Urology  Referral - To a [facility] Location (Use POS/Location)    Radiology Referral (Affiliate Use Only)    US TRANSVAGINAL ONLY    URINE CULTURE AEROBIC BACTERIAL ([facility])    VENOUS COLLECTION (Completed)    HEMOGRAM PLATELET DIFF ([facility]) (Completed)    Comprehensive Metobolic Panel ([facility])           1. Pelvic pain in female  Ongoing pelvic discomfort. She has has now had 2 negative urine cultures but the urine looks abnormal. She wants to restart an antibiotic, as she will be out of town. Culture pending. She has an apt with urology for the microscopic hematuria but in October, so due to the pelvic discomfort also, I will have her see urogynecology as well as do a pelvic ultrasound.  Check bloodwork also.  - URINALYSIS, ROUTINE ([facility])  - Adult Urology  Referral - To a [facility] Location (Use POS/Location)  - Radiology Referral (Affiliate Use Only)  - US TRANSVAGINAL ONLY  - URINE CULTURE AEROBIC BACTERIAL ([facility])  - ciprofloxacin (CIPRO) 500 MG tablet; Take 1 tablet (500 mg) by mouth 2 times daily for 7 days  Dispense: 14 tablet; Refill: 0  - VENOUS COLLECTION  - HEMOGRAM PLATELET DIFF ([facility])  - Comprehensive Metobolic Panel ([facility])              FUTURE APPOINTMENTS:       - Follow-up visit per results. We will continue to manage her chronic medical care.    No follow-ups on file.    Ayla Ellington MD  [facility]    Subjective     Nursing Notes:   Charlotte Garcia CMA  7/11/2024  9:43 AM  Signed  Chief Complaint   Patient presents with    Abdominal Pain     Lower abdominal cramping/ discomfort, ongoing urinary urgency     Pre-visit Screening:  Immunizations:  up to date  Colonoscopy:  NA  Mammogram:  NA  Asthma Action Test/Plan:  NA  PHQ9:  NA  GAD7:  NA  Questioned patient about current smoking habits Pt. has never smoked.  Ok to leave detailed message on voice mail for today's visit only Yes, phone # [phone]       Lynn Chun is a 39 year old female who presents to clinic today for the following health issues   HPI     Has lower pelvic discomfort, urinary urgency, is a deep pain. 2 nights ago it was very specifically urinary. Dark urine in the morning.  When saw me I put her on cipro, urine was abnormal but culture was negative. So she followed up with urgent care and they did a urine check, blood in the urine, referred her to see urology but apt in October. They did a pelvic exam and found bv, put her on metronidazole.   No fevers. She stopped the cipro because they told her to stop it.   Has one more day for the metronidazole.        Review of Systems   Constitutional, HEENT, cardiovascular, pulmonary, gi and gu systems are negative, except as otherwise noted.      Objective    /76 (BP Location: Right arm, Patient Position: Sitting, Cuff Size: Adult Large)   Pulse 100   Temp 97.7  F (36.5  C) (Temporal)   Wt 68.9 kg (152 lb)   LMP 06/10/2024   SpO2 99%   BMI 23.81 kg/m    Body mass index is 23.81 kg/m .  Physical Exam   GENERAL: alert and no distress  ABDOMEN: soft, nontender, no hepatosplenomegaly, no masses and bowel sounds normal  MS: no gross musculoskeletal defects noted, no edema  NEURO: Normal strength and tone, mentation intact and speech normal  PSYCH: mentation appears normal, affect normal/bright  Reports no abdominal discomfort today.    Results for orders placed or performed in visit on 07/11/24   URINALYSIS, ROUTINE ([facility])     Status: Abnormal   Result Value Ref Range    Color Urine Yellow     Appearance Urine Cloudy (A)     Glucose Urine Neg mg/dL    Bilirubin Urine Mod (A)     Ketones Urine 40 (A) mg/dL    Specific Gravity Urine Other (A)     Blood Urine Small (A)     pH  Urine 5.5 pH    Protein Urine neg neg - neg mg/dL    Urobilinogen Urine 0.2 EU/dL    Nitrite Urine Neg     Leukocytes TRACE (A)     Wbc, Urine Micro 3-6 (A) neg - 2    RBC Micro Urine 5-10 (A) neg - 2    EP/HPF MOD (A)     Bacteria Urine MOD (A) neg - neg    Casts/LPF neg     Miscellaneous large mucus strands (A)    HEMOGRAM PLATELET DIFF ([facility])     Status: None   Result Value Ref Range    WBC 4.5 4.0 - 11 10*9/L    RBC Count 4.33 3.8 - 5.2 10*12/L    Hemoglobin 13.3 11.7 - 15.7 g/dL    Hematocrit 40.4 35.0 - 47.0 %    MCV 93.3 78 - 100 fL    MCH 30.7 26 - 33 pg    MCHC 32.9 31 - 36 g/dL    Platelet Count 171 150 - 375 10^9/L    % Granulocytes 53.9 %    % Lymphocytes 37.9 %    % Monocytes 8.2 %

## 2024-07-11 ENCOUNTER — OFFICE VISIT (OUTPATIENT)
Dept: FAMILY MEDICINE | Facility: CLINIC | Age: 39
End: 2024-07-11

## 2024-07-11 VITALS
WEIGHT: 152 LBS | OXYGEN SATURATION: 99 % | DIASTOLIC BLOOD PRESSURE: 76 MMHG | TEMPERATURE: 97.7 F | BODY MASS INDEX: 23.81 KG/M2 | HEART RATE: 100 BPM | SYSTOLIC BLOOD PRESSURE: 128 MMHG

## 2024-07-11 DIAGNOSIS — R10.2 PELVIC PAIN IN FEMALE: Primary | ICD-10-CM

## 2024-07-11 LAB
% GRANULOCYTES: 53.9 %
ALBUMIN SERPL-MCNC: 4.1 G/DL (ref 3.6–5.1)
ALP SERPL-CCNC: 42 U/L (ref 33–130)
ALT 1742-6: 6 U/L (ref 0–32)
APPEARANCE UR: ABNORMAL
AST 1920-8: 14 U/L (ref 0–35)
BACTERIA, UR: ABNORMAL
BILIRUB SERPL-MCNC: 0.5 MG/DL (ref 0.2–1.2)
BILIRUB UR QL: ABNORMAL
BUN SERPL-MCNC: 12 MG/DL (ref 7–25)
BUN/CREATININE RATIO: 12 (ref 6–32)
CALCIUM SERPL-MCNC: 9.1 MG/DL (ref 8.6–10.3)
CASTS/LPF: ABNORMAL
CHLORIDE SERPLBLD-SCNC: 103 MMOL/L (ref 98–110)
CO2 SERPL-SCNC: 25.1 MMOL/L (ref 20–32)
COLOR UR: YELLOW
CREAT SERPL-MCNC: 0.97 MG/DL (ref 0.6–1.3)
EP/HPF: ABNORMAL
GLUCOSE SERPL-MCNC: 84 MG/DL (ref 60–99)
GLUCOSE URINE: ABNORMAL MG/DL
HCT VFR BLD AUTO: 40.4 % (ref 35–47)
HEMOGLOBIN: 13.3 G/DL (ref 11.7–15.7)
HGB UR QL: ABNORMAL
KETONES UR QL: 40 MG/DL
LYMPHOCYTES NFR BLD AUTO: 37.9 %
MCH RBC QN AUTO: 30.7 PG (ref 26–33)
MCHC RBC AUTO-ENTMCNC: 32.9 G/DL (ref 31–36)
MCV RBC AUTO: 93.3 FL (ref 78–100)
MISC.: ABNORMAL
MONOCYTES NFR BLD AUTO: 8.2 %
NITRITE UR QL STRIP: ABNORMAL
PH UR STRIP: 5.5 PH (ref 5–7)
PLATELET COUNT - QUEST: 171 10^9/L (ref 150–375)
POTASSIUM SERPL-SCNC: 3.81 MMOL/L (ref 3.5–5.3)
PROT SERPL-MCNC: 7.1 G/DL (ref 6.1–8.1)
PROT UR QL: ABNORMAL MG/DL
RBC # BLD AUTO: 4.33 10*12/L (ref 3.8–5.2)
RBC, UR MICRO: ABNORMAL (ref ?–2)
SODIUM SERPL-SCNC: 139.2 MMOL/L (ref 135–146)
SP GR UR STRIP: ABNORMAL
UROBILINOGEN UR QL STRIP: 0.2 EU/DL (ref 0.2–1)
WBC # BLD AUTO: 4.5 10*9/L (ref 4–11)
WBC #/AREA URNS HPF: ABNORMAL /[HPF]
WBC, UR MICRO: ABNORMAL (ref ?–2)

## 2024-07-11 PROCEDURE — 36415 COLL VENOUS BLD VENIPUNCTURE: CPT | Performed by: FAMILY MEDICINE

## 2024-07-11 PROCEDURE — 99214 OFFICE O/P EST MOD 30 MIN: CPT | Performed by: FAMILY MEDICINE

## 2024-07-11 PROCEDURE — 80053 COMPREHEN METABOLIC PANEL: CPT | Performed by: FAMILY MEDICINE

## 2024-07-11 PROCEDURE — 81001 URINALYSIS AUTO W/SCOPE: CPT | Performed by: FAMILY MEDICINE

## 2024-07-11 PROCEDURE — G2211 COMPLEX E/M VISIT ADD ON: HCPCS | Performed by: FAMILY MEDICINE

## 2024-07-11 PROCEDURE — 85025 COMPLETE CBC W/AUTO DIFF WBC: CPT | Performed by: FAMILY MEDICINE

## 2024-07-11 RX ORDER — CIPROFLOXACIN 500 MG/1
500 TABLET, FILM COATED ORAL 2 TIMES DAILY
Qty: 14 TABLET | Refills: 0 | Status: SHIPPED | OUTPATIENT
Start: 2024-07-11 | End: 2024-07-18

## 2024-07-11 NOTE — NURSING NOTE
Chief Complaint   Patient presents with    Abdominal Pain     Lower abdominal cramping/ discomfort, ongoing urinary urgency     Pre-visit Screening:  Immunizations:  up to date  Colonoscopy:  NA  Mammogram: NA  Asthma Action Test/Plan:  NA  PHQ9:  NA  GAD7:  NA  Questioned patient about current smoking habits Pt. has never smoked.  Ok to leave detailed message on voice mail for today's visit only Yes, phone # [phone]

## 2024-07-13 LAB — URINE VOIDED CULTURE: NORMAL

## 2024-07-23 ENCOUNTER — TRANSFERRED RECORDS (OUTPATIENT)
Dept: FAMILY MEDICINE | Facility: CLINIC | Age: 39
End: 2024-07-23

## 2025-04-26 ENCOUNTER — HEALTH MAINTENANCE LETTER (OUTPATIENT)
Age: 40
End: 2025-04-26

## 2025-08-09 ENCOUNTER — HEALTH MAINTENANCE LETTER (OUTPATIENT)
Age: 40
End: 2025-08-09